# Patient Record
Sex: MALE | Race: WHITE | NOT HISPANIC OR LATINO | Employment: OTHER | URBAN - METROPOLITAN AREA
[De-identification: names, ages, dates, MRNs, and addresses within clinical notes are randomized per-mention and may not be internally consistent; named-entity substitution may affect disease eponyms.]

---

## 2017-01-16 ENCOUNTER — COMMUNICATION - HEALTHEAST (OUTPATIENT)
Dept: INTERNAL MEDICINE | Facility: CLINIC | Age: 67
End: 2017-01-16

## 2017-01-16 DIAGNOSIS — I71.22 ANEURYSM OF AORTIC ARCH (H): ICD-10-CM

## 2017-02-13 ENCOUNTER — AMBULATORY - HEALTHEAST (OUTPATIENT)
Dept: INTERNAL MEDICINE | Facility: CLINIC | Age: 67
End: 2017-02-13

## 2017-02-13 DIAGNOSIS — Z79.899 ENCOUNTER FOR LONG-TERM (CURRENT) USE OF OTHER MEDICATIONS: ICD-10-CM

## 2017-02-13 DIAGNOSIS — Z12.5 SPECIAL SCREENING FOR MALIGNANT NEOPLASM OF PROSTATE: ICD-10-CM

## 2017-02-13 DIAGNOSIS — R73.01 IMPAIRED FASTING GLUCOSE: ICD-10-CM

## 2017-02-13 DIAGNOSIS — Z13.220 LIPID SCREENING: ICD-10-CM

## 2017-02-16 ENCOUNTER — AMBULATORY - HEALTHEAST (OUTPATIENT)
Dept: INTERNAL MEDICINE | Facility: CLINIC | Age: 67
End: 2017-02-16

## 2017-02-16 ENCOUNTER — OFFICE VISIT - HEALTHEAST (OUTPATIENT)
Dept: INTERNAL MEDICINE | Facility: CLINIC | Age: 67
End: 2017-02-16

## 2017-02-16 DIAGNOSIS — Z00.00 MEDICARE ANNUAL WELLNESS VISIT, INITIAL: ICD-10-CM

## 2017-02-16 DIAGNOSIS — Z23 NEED FOR PNEUMOCOCCAL VACCINATION: ICD-10-CM

## 2017-02-16 DIAGNOSIS — K58.9 IRRITABLE BOWEL SYNDROME: ICD-10-CM

## 2017-02-16 DIAGNOSIS — R73.01 IMPAIRED FASTING GLUCOSE: ICD-10-CM

## 2017-02-16 DIAGNOSIS — I71.20 ANEURYSM OF THORACIC AORTA (H): ICD-10-CM

## 2017-02-16 DIAGNOSIS — M23.91 KNEE DERANGEMENT, RIGHT: ICD-10-CM

## 2017-02-16 ASSESSMENT — MIFFLIN-ST. JEOR: SCORE: 1658.48

## 2017-02-24 ENCOUNTER — COMMUNICATION - HEALTHEAST (OUTPATIENT)
Dept: INTERNAL MEDICINE | Facility: CLINIC | Age: 67
End: 2017-02-24

## 2017-02-24 DIAGNOSIS — Z51.81 MEDICATION MONITORING ENCOUNTER: ICD-10-CM

## 2017-03-01 ENCOUNTER — HOSPITAL ENCOUNTER (OUTPATIENT)
Dept: MRI IMAGING | Facility: HOSPITAL | Age: 67
Discharge: HOME | End: 2017-03-01
Attending: INTERNAL MEDICINE

## 2017-03-01 ENCOUNTER — COMMUNICATION - HEALTHEAST (OUTPATIENT)
Dept: TELEHEALTH | Facility: CLINIC | Age: 67
End: 2017-03-01

## 2017-03-01 DIAGNOSIS — M23.91 KNEE DERANGEMENT, RIGHT: ICD-10-CM

## 2017-03-02 ENCOUNTER — COMMUNICATION - HEALTHEAST (OUTPATIENT)
Dept: INTERNAL MEDICINE | Facility: CLINIC | Age: 67
End: 2017-03-02

## 2017-03-10 ENCOUNTER — COMMUNICATION - HEALTHEAST (OUTPATIENT)
Dept: INTERNAL MEDICINE | Facility: CLINIC | Age: 67
End: 2017-03-10

## 2017-03-10 DIAGNOSIS — I71.22 ANEURYSM OF AORTIC ARCH (H): ICD-10-CM

## 2017-03-27 ENCOUNTER — COMMUNICATION - HEALTHEAST (OUTPATIENT)
Dept: INTERNAL MEDICINE | Facility: CLINIC | Age: 67
End: 2017-03-27

## 2017-03-27 DIAGNOSIS — Z51.81 MEDICATION MONITORING ENCOUNTER: ICD-10-CM

## 2017-07-06 ENCOUNTER — COMMUNICATION - HEALTHEAST (OUTPATIENT)
Dept: INTERNAL MEDICINE | Facility: CLINIC | Age: 67
End: 2017-07-06

## 2017-07-06 DIAGNOSIS — Z51.81 MEDICATION MONITORING ENCOUNTER: ICD-10-CM

## 2017-08-01 ENCOUNTER — COMMUNICATION - HEALTHEAST (OUTPATIENT)
Dept: SCHEDULING | Facility: CLINIC | Age: 67
End: 2017-08-01

## 2017-08-01 ENCOUNTER — COMMUNICATION - HEALTHEAST (OUTPATIENT)
Dept: INTERNAL MEDICINE | Facility: CLINIC | Age: 67
End: 2017-08-01

## 2017-08-01 DIAGNOSIS — I10 BENIGN ESSENTIAL HTN: ICD-10-CM

## 2017-08-01 RX ORDER — METOPROLOL SUCCINATE 25 MG/1
25 TABLET, EXTENDED RELEASE ORAL DAILY
Qty: 90 TABLET | Refills: 3 | Status: SHIPPED | OUTPATIENT
Start: 2017-08-01 | End: 2022-05-26

## 2017-08-12 ENCOUNTER — COMMUNICATION - HEALTHEAST (OUTPATIENT)
Dept: INTERNAL MEDICINE | Facility: CLINIC | Age: 67
End: 2017-08-12

## 2017-08-12 DIAGNOSIS — Z51.81 MEDICATION MONITORING ENCOUNTER: ICD-10-CM

## 2017-08-13 RX ORDER — BRIMONIDINE TARTRATE 1 MG/ML
SOLUTION/ DROPS OPHTHALMIC
Qty: 5 ML | Refills: 0 | Status: SHIPPED | OUTPATIENT
Start: 2017-08-13 | End: 2022-05-26

## 2017-09-03 ENCOUNTER — COMMUNICATION - HEALTHEAST (OUTPATIENT)
Dept: INTERNAL MEDICINE | Facility: CLINIC | Age: 67
End: 2017-09-03

## 2017-09-03 DIAGNOSIS — Z51.81 MEDICATION MONITORING ENCOUNTER: ICD-10-CM

## 2017-09-23 ENCOUNTER — COMMUNICATION - HEALTHEAST (OUTPATIENT)
Dept: INTERNAL MEDICINE | Facility: CLINIC | Age: 67
End: 2017-09-23

## 2017-09-23 DIAGNOSIS — Z51.81 MEDICATION MONITORING ENCOUNTER: ICD-10-CM

## 2020-01-08 ENCOUNTER — OFFICE VISIT - HEALTHEAST (OUTPATIENT)
Dept: INTERNAL MEDICINE | Facility: CLINIC | Age: 70
End: 2020-01-08

## 2020-01-08 ENCOUNTER — COMMUNICATION - HEALTHEAST (OUTPATIENT)
Dept: INTERNAL MEDICINE | Facility: CLINIC | Age: 70
End: 2020-01-08

## 2020-01-08 DIAGNOSIS — L85.3 DRY SKIN: ICD-10-CM

## 2020-01-08 RX ORDER — ATENOLOL 25 MG/1
TABLET ORAL
Status: SHIPPED | COMMUNITY
Start: 2019-10-15

## 2020-01-08 ASSESSMENT — MIFFLIN-ST. JEOR: SCORE: 1689.32

## 2021-01-26 ENCOUNTER — IMMUNIZATION (OUTPATIENT)
Dept: NURSING | Facility: CLINIC | Age: 71
End: 2021-01-26
Payer: COMMERCIAL

## 2021-01-26 PROCEDURE — 0001A PR COVID VAC PFIZER DIL RECON 30 MCG/0.3 ML IM: CPT

## 2021-01-26 PROCEDURE — 91300 PR COVID VAC PFIZER DIL RECON 30 MCG/0.3 ML IM: CPT

## 2021-02-16 ENCOUNTER — IMMUNIZATION (OUTPATIENT)
Dept: NURSING | Facility: CLINIC | Age: 71
End: 2021-02-16
Attending: FAMILY MEDICINE
Payer: COMMERCIAL

## 2021-02-16 PROCEDURE — 91300 PR COVID VAC PFIZER DIL RECON 30 MCG/0.3 ML IM: CPT

## 2021-02-16 PROCEDURE — 0002A PR COVID VAC PFIZER DIL RECON 30 MCG/0.3 ML IM: CPT

## 2021-03-14 ENCOUNTER — HEALTH MAINTENANCE LETTER (OUTPATIENT)
Age: 71
End: 2021-03-14

## 2021-07-20 VITALS
OXYGEN SATURATION: 97 % | HEART RATE: 56 BPM | DIASTOLIC BLOOD PRESSURE: 66 MMHG | HEIGHT: 73 IN | BODY MASS INDEX: 25.58 KG/M2 | SYSTOLIC BLOOD PRESSURE: 114 MMHG | WEIGHT: 193 LBS

## 2021-07-20 VITALS — WEIGHT: 186.2 LBS | HEIGHT: 73 IN | BODY MASS INDEX: 24.68 KG/M2

## 2021-07-20 NOTE — PROGRESS NOTES
[facility] Follow Up Note    Assessment/Plan:    1. Dry skin.  Patient does have dry skin and some skin peeling on his left thumb.  Discussed that I do not think he has infection, fungal infection or frostbite.  His thumb appears to be well perfused.  There is mild arthritis in the MTP joint there.  He does see his hands a lot at work and we discussed applying [employer] healing lotion several times throughout the day.    Patient does have PCP with [facility] and I recommended that he follows up there for annual checkups.    Emma Rose MD    Chief Complaint:  Chief Complaint   Patient presents with     Consult     possible infection, possible frostbite on hand denied pain and ache       History of Present Illness:  Javad is a 69 y.o. male, prior patient of Dr. Andrea, who is currently being followed by primary care provider at [facility] who is currently here for acute visit due to skin changes on his right thumb.  He does have history of IBS, aortic aneurysm, sleep apnea, mild anemia and prediabetes.    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  He does have a lot of dry skin and occasional cracks in his fingers.  His left thumb developed some peeling of the skin.  He thinks his he might have burned it slightly when he was cauterizing something at work.  Subsequently he also get mild coldness in his hand when he was cross-country skiing.  Currently he denies any pain.    Review of Systems:  A comprehensive review of systems was performed and was otherwise negative    PFSH:  Social History: Reviewed, he is a practicing dentist  Social History     Tobacco Use   Smoking Status Never Smoker   Smokeless Tobacco Never Used     Social History     Social History Narrative     Not on file       Past History: Reviewed  Current Outpatient Medications   Medication Sig Dispense Refill     ALPHAGAN P 0.1 % Drop INSTILL 1 DROP INTO EACH EYE TWICE DAILY 5 mL 0     ascorbic acid (ASCORBIC ACID)  "250 mg Chew Chew 250 mg.       atenolol (TENORMIN) 25 MG tablet        EPIPEN 2-ARTIE 0.3 mg/0.3 mL atIn USE AS DIRECTED FOR ALLERGIC REACTION 2 Pre-filled Pen Syringe 2     latanoprost (XALATAN) 0.005 % ophthalmic solution 1 drop.       tamsulosin (FLOMAX) 0.4 mg cap Take 0.4 mg by mouth.       metoprolol succinate (TOPROL XL) 25 MG Take 1 tablet (25 mg total) by mouth daily. 90 tablet 3     No current facility-administered medications for this visit.        Family History: Viewed    Physical Exam:    Vitals:    01/08/20 1508   BP: 114/66   Patient Site: Left Arm   Patient Position: Sitting   Cuff Size: Adult Regular   Pulse: (!) 56   SpO2: 97%   Weight: 193 lb (87.5 kg)   Height: 6' 1\" (1.854 m)     Wt Readings from Last 3 Encounters:   01/08/20 193 lb (87.5 kg)   02/16/17 186 lb 3.2 oz (84.5 kg)   02/18/16 178 lb (80.7 kg)     Body mass index is 25.46 kg/m .    Constitutional:  Reveals a pleasant male.  Vitals:  Per nursing notes.  Cardiac:  Regular rate and rhythm,no murmurs, rubs, or gallops. Carotids without bruits. Legs without edema. Palpation of the radial pulse regular.  Lungs: Clear to auscultation bl.  Respiratory effort normal.  Skin: Dry skin on hands bilaterally.  Left thumb has mild skin peeling but no associated cellulitis, fungal infection or open skin area.  Rheumatologic: Mild arthritis in his left thumb noted.  Rest of the fingers are good.  Psychiatric: affect appropriate, memory intact.     Data Review:    Analysis and Summary of Old Records (2): yes      Records Requested (1): no      Other History Summarized (from other people in the room) (2): no    Radiology Tests Summarized (XRAY/CT/MRI/DXA) (1): no    Labs Reviewed (1): no    Medicine Tests Reviewed (EKG/ECHO/COLONOSCOPY/EGD) (1): no    Independent Review of EKG or X-RAY (2): no      "

## 2021-07-20 NOTE — TELEPHONE ENCOUNTER
Spoke with pt to get more information, per pcp request.  Pt reports he went cross country skiing 2 weeks ago, and he may have had frostbite.  Reports his thumb was red, but got better.    No reports his left thumb is swollen, red, painful and the skin is peeling. 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      Reports his thumb did get better from the initial cross country skiing incident, but now it's getting worse.  Pt wants to be seen to make sure there's no infection.    Pt has appt with Dr. Rose today.
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
## 2021-10-24 ENCOUNTER — HEALTH MAINTENANCE LETTER (OUTPATIENT)
Age: 71
End: 2021-10-24

## 2022-02-23 ENCOUNTER — PATIENT OUTREACH (OUTPATIENT)
Dept: ONCOLOGY | Facility: CLINIC | Age: 72
End: 2022-02-23

## 2022-02-23 ENCOUNTER — OFFICE VISIT (OUTPATIENT)
Dept: INTERNAL MEDICINE | Facility: CLINIC | Age: 72
End: 2022-02-23
Payer: COMMERCIAL

## 2022-02-23 VITALS
WEIGHT: 177.4 LBS | OXYGEN SATURATION: 98 % | SYSTOLIC BLOOD PRESSURE: 118 MMHG | BODY MASS INDEX: 23.41 KG/M2 | DIASTOLIC BLOOD PRESSURE: 70 MMHG | HEART RATE: 53 BPM

## 2022-02-23 DIAGNOSIS — Z80.41 FAMILY HISTORY OF MALIGNANT NEOPLASM OF OVARY: ICD-10-CM

## 2022-02-23 DIAGNOSIS — Z00.00 ENCOUNTER FOR MEDICARE ANNUAL WELLNESS EXAM: Primary | ICD-10-CM

## 2022-02-23 DIAGNOSIS — Z12.11 SCREEN FOR COLON CANCER: ICD-10-CM

## 2022-02-23 DIAGNOSIS — Z13.220 SCREENING FOR HYPERLIPIDEMIA: ICD-10-CM

## 2022-02-23 DIAGNOSIS — R73.03 PREDIABETES: ICD-10-CM

## 2022-02-23 PROBLEM — K08.409 WISDOM TEETH EXTRACTED: Status: ACTIVE | Noted: 2022-02-23

## 2022-02-23 PROBLEM — I77.89 AORTIC ROOT ENLARGEMENT (H): Status: ACTIVE | Noted: 2022-02-23

## 2022-02-23 PROBLEM — Z86.0100 HISTORY OF COLONIC POLYPS: Status: ACTIVE | Noted: 2022-02-23

## 2022-02-23 PROBLEM — M17.11 OSTEOARTHRITIS OF RIGHT KNEE, UNSPECIFIED OSTEOARTHRITIS TYPE: Status: ACTIVE | Noted: 2022-02-23

## 2022-02-23 PROBLEM — Z90.89 HX OF TONSILLECTOMY: Status: ACTIVE | Noted: 2022-02-23

## 2022-02-23 LAB
ALBUMIN SERPL-MCNC: 3.8 G/DL (ref 3.5–5)
ALP SERPL-CCNC: 62 U/L (ref 45–120)
ALT SERPL W P-5'-P-CCNC: 24 U/L (ref 0–45)
ANION GAP SERPL CALCULATED.3IONS-SCNC: 6 MMOL/L (ref 5–18)
AST SERPL W P-5'-P-CCNC: 23 U/L (ref 0–40)
BILIRUB SERPL-MCNC: 0.9 MG/DL (ref 0–1)
BUN SERPL-MCNC: 22 MG/DL (ref 8–28)
CALCIUM SERPL-MCNC: 9.4 MG/DL (ref 8.5–10.5)
CHLORIDE BLD-SCNC: 104 MMOL/L (ref 98–107)
CHOLEST SERPL-MCNC: 143 MG/DL
CO2 SERPL-SCNC: 28 MMOL/L (ref 22–31)
CREAT SERPL-MCNC: 0.94 MG/DL (ref 0.7–1.3)
ERYTHROCYTE [DISTWIDTH] IN BLOOD BY AUTOMATED COUNT: 12.3 % (ref 10–15)
FASTING STATUS PATIENT QL REPORTED: YES
GFR SERPL CREATININE-BSD FRML MDRD: 87 ML/MIN/1.73M2
GLUCOSE BLD-MCNC: 132 MG/DL (ref 70–125)
HBA1C MFR BLD: 6 % (ref 0–5.6)
HCT VFR BLD AUTO: 41.1 % (ref 40–53)
HDLC SERPL-MCNC: 45 MG/DL
HGB BLD-MCNC: 13.4 G/DL (ref 13.3–17.7)
LDLC SERPL CALC-MCNC: 86 MG/DL
MCH RBC QN AUTO: 32.3 PG (ref 26.5–33)
MCHC RBC AUTO-ENTMCNC: 32.6 G/DL (ref 31.5–36.5)
MCV RBC AUTO: 99 FL (ref 78–100)
PLATELET # BLD AUTO: 168 10E3/UL (ref 150–450)
POTASSIUM BLD-SCNC: 4.7 MMOL/L (ref 3.5–5)
PROT SERPL-MCNC: 6.7 G/DL (ref 6–8)
RBC # BLD AUTO: 4.15 10E6/UL (ref 4.4–5.9)
SODIUM SERPL-SCNC: 138 MMOL/L (ref 136–145)
TRIGL SERPL-MCNC: 60 MG/DL
WBC # BLD AUTO: 7.4 10E3/UL (ref 4–11)

## 2022-02-23 PROCEDURE — 85027 COMPLETE CBC AUTOMATED: CPT | Performed by: INTERNAL MEDICINE

## 2022-02-23 PROCEDURE — 80061 LIPID PANEL: CPT | Performed by: INTERNAL MEDICINE

## 2022-02-23 PROCEDURE — 36415 COLL VENOUS BLD VENIPUNCTURE: CPT | Performed by: INTERNAL MEDICINE

## 2022-02-23 PROCEDURE — 99397 PER PM REEVAL EST PAT 65+ YR: CPT | Performed by: INTERNAL MEDICINE

## 2022-02-23 PROCEDURE — 80053 COMPREHEN METABOLIC PANEL: CPT | Performed by: INTERNAL MEDICINE

## 2022-02-23 PROCEDURE — 83036 HEMOGLOBIN GLYCOSYLATED A1C: CPT | Performed by: INTERNAL MEDICINE

## 2022-02-23 ASSESSMENT — ACTIVITIES OF DAILY LIVING (ADL): CURRENT_FUNCTION: NO ASSISTANCE NEEDED

## 2022-02-23 NOTE — LETTER
February 24, 2022      Javad Nicholson II  [address] [facility] [address]        Dear SeraBharat,    We are writing to inform you of your test results.    Your tests are satisfactory.  The mild elevation of blood sugar and A1c are consistent with very mild diabetes.  No treatment is needed, other than what you do - maintain your weight, and exercise.     Resulted Orders   Lipid panel reflex to direct LDL Fasting   Result Value Ref Range    Cholesterol 143 <=199 mg/dL    Triglycerides 60 <=149 mg/dL    Direct Measure HDL 45 >=40 mg/dL      Comment:      HDL Cholesterol Reference Range:     0-2 years:   No reference ranges established for patients under 2 years old  at [facility] for lipid analytes.    2-8 years:  Greater than 45 mg/dL     18 years and older:   Female: Greater than or equal to 50 mg/dL   Male:   Greater than or equal to 40 mg/dL    LDL Cholesterol Calculated 86 <=129 mg/dL    Patient Fasting > 8hrs? Yes    CBC with platelets   Result Value Ref Range    WBC Count 7.4 4.0 - 11.0 10e3/uL    RBC Count 4.15 (L) 4.40 - 5.90 10e6/uL    Hemoglobin 13.4 13.3 - 17.7 g/dL    Hematocrit 41.1 40.0 - 53.0 %    MCV 99 78 - 100 fL    MCH 32.3 26.5 - 33.0 pg    MCHC 32.6 31.5 - 36.5 g/dL    RDW 12.3 10.0 - 15.0 %    Platelet Count 168 150 - 450 10e3/uL   Comprehensive metabolic panel   Result Value Ref Range    Sodium 138 136 - 145 mmol/L    Potassium 4.7 3.5 - 5.0 mmol/L    Chloride 104 98 - 107 mmol/L    Carbon Dioxide (CO2) 28 22 - 31 mmol/L    Anion Gap 6 5 - 18 mmol/L    Urea Nitrogen 22 8 - 28 mg/dL    Creatinine 0.94 0.70 - 1.30 mg/dL    Calcium 9.4 8.5 - 10.5 mg/dL    Glucose 132 (H) 70 - 125 mg/dL    Alkaline Phosphatase 62 45 - 120 U/L    AST 23 0 - 40 U/L    ALT 24 0 - 45 U/L    Protein Total 6.7 6.0 - 8.0 g/dL    Albumin 3.8 3.5 - 5.0 g/dL    Bilirubin Total 0.9 0.0 - 1.0 mg/dL    GFR Estimate 87 >60 mL/min/1.73m2      Comment:      Effective December 21, 2021 eGFRcr in adults is  calculated using the 2021 CKD-EPI creatinine equation which includes age and gender (Steffi et al., NEJM, DOI: 10.1056/FHPSjy4294932)   Hemoglobin A1c   Result Value Ref Range    Hemoglobin A1C 6.0 (H) 0.0 - 5.6 %      Comment:      Normal <5.7%   Prediabetes 5.7-6.4%    Diabetes 6.5% or higher     Note: Adopted from ADA consensus guidelines.       If you have any questions or concerns, please call the clinic at the number listed above.       Sincerely,      Jed Hilario MD

## 2022-02-23 NOTE — PATIENT INSTRUCTIONS
Patient Education   Personalized Prevention Plan  You are due for the preventive services outlined below.  Your care team is available to assist you in scheduling these services.  If you have already completed any of these items, please share that information with your care team to update in your medical record.  Health Maintenance Due   Topic Date Due     ANNUAL REVIEW OF HM ORDERS  Never done     Annual Wellness Visit  12/17/2016     Colorectal Cancer Screening  09/08/2018     Cholesterol Lab  12/17/2020     FALL RISK ASSESSMENT  01/08/2021     COVID-19 Vaccine (3 - Booster for Pfizer series) 07/16/2021     Flu Vaccine (1) 09/01/2021

## 2022-02-23 NOTE — PROGRESS NOTES
"Answers for HPI/ROS submitted by the patient on 2/23/2022  In general, how would you rate your overall physical health?: excellent  Frequency of exercise:: 2-3 days/week  Do you usually eat at least 4 servings of fruit and vegetables a day, include whole grains & fiber, and avoid regularly eating high fat or \"junk\" foods? : Yes  Taking medications regularly:: No  Medication side effects:: Muscle aches  Activities of Daily Living: no assistance needed  Home safety: no safety concerns identified  Hearing Impairment:: no hearing concerns  In the past 6 months, have you been bothered by leaking of urine?: No  In general, how would you rate your overall mental or emotional health?: good  Additional concerns today:: Yes  Duration of exercise:: Greater than 60 minutes  Barriers to taking medications:: Side effects    SUBJECTIVE:   CC: Javad Nicholson II is an 72 year old male who presents for preventative health visit.     HPI:  He has been well.  Does get dryness dermatitis on back.  Uses dial soap.  He denies any unusual dyspnea or cough, or bowel or bladder issues.  Weight has been stable.  He continues to work as a dentist.      {SPatient has been advised of split billing requirements and indicates understanding: Yes  Healthy Habits:     In general, how would you rate your overall health?  Excellent    Frequency of exercise:  2-3 days/week    Duration of exercise:  Greater than 60 minutes    Do you usually eat at least 4 servings of fruit and vegetables a day, include whole grains    & fiber and avoid regularly eating high fat or \"junk\" foods?  Yes    Taking medications regularly:  No    Barriers to taking medications:  Side effects    Medication side effects:  Muscle aches    Ability to successfully perform activities of daily living:  No assistance needed    Home Safety:  No safety concerns identified    Hearing Impairment:  No hearing concerns    In the past 6 months, have you been bothered by leaking of urine?  " No    In general, how would you rate your overall mental or emotional health?  Good      PHQ-2 Total Score: 0    Additional concerns today:  Yes    Ability to successfully perform activities of daily living: Yes, no assistance needed    Today's PHQ-2 Score:   PHQ-2 ( 1999 Pfizer) 2/23/2022   Q1: Little interest or pleasure in doing things 0   Q2: Feeling down, depressed or hopeless 0   PHQ-2 Score 0   Q1: Little interest or pleasure in doing things Not at all   Q2: Feeling down, depressed or hopeless Not at all   PHQ-2 Score 0     Abuse: Current or Past(Physical, Sexual or Emotional)- No  Do you feel safe in your environment? Yes    Have you ever done Advance Care Planning? (For example, a Health Directive, POLST, or a discussion with a medical provider or your loved ones about your wishes): Yes, advance care planning is on file.    Social History     Tobacco Use     Smoking status: Never Smoker     Smokeless tobacco: Never Used   Substance Use Topics     Alcohol use: Not on file     Alcohol Use 2/23/2022   Prescreen: >3 drinks/day or >7 drinks/week? No     Last PSA:   Prostate Specific Antigen Screen   Date Value Ref Range Status   12/17/2015 0.4 0.0 - 4.5 ng/mL Final     Reviewed and updated as needed this visit by clinical staff  Reviewed and updated as needed this visit by Provider     Past Medical History:   Diagnosis Date     Aortic root enlargement (H)      History of colonic polyps      Hx of tonsillectomy      Osteoarthritis of right knee, unspecified osteoarthritis type       Past Surgical History:   Procedure Laterality Date     TONSILLECTOMY & ADENOIDECTOMY       WISDOM TOOTH EXTRACTION       Review of Systems  See HPI    OBJECTIVE:     PHYSICAL EXAM:  General Appearance: In no acute distress  /70   Pulse 53   Wt 80.5 kg (177 lb 6.4 oz)   SpO2 98%   BMI 23.41 kg/m    NECK:  without cervical or axillary adenopathy  RESPIRATORY: Clear  CARDIOVASCULAR: S1, S2  ABDOMEN: soft, flat, and non-tender,  "without mass, rebound, or guarding  EXTREMITIES: No joint swelling, no ulcer or edema  NEUROLOGIC: No arm or leg  weakness, speech is clear, gait is normal    ASSESSMENT/PLAN:     Javad was seen today for physical.    Encounter for Medicare annual wellness exam  He is healthy without concerns on present history and exam, see documentation.   -     CBC with platelets  -     Comprehensive metabolic panel    Screen for colon cancer  He feels he is UTD, will get records.      Screening for hyperlipidemia  -     Lipid panel reflex to direct LDL [place]    Family history of malignant neoplasm of ovary  Given that he has daughter, and given his family history, will get genetic counseling.   -     Cancer Risk Mgmt/Cancer Genetic Counseling Referral; Future    Prediabetes  Has not seemed to need any treatment.  Has family history.  Will follow.  He may be candidate for metformin in the future.   -     Hemoglobin A1c    Patient has been advised of split billing requirements and indicates understanding: Yes    COUNSELING:   Reviewed preventive health counseling, as reflected in patient instructions       Regular exercise       Healthy diet/nutrition       Colorectal cancer screening       Prostate cancer screening    Estimated body mass index is 25.46 kg/m  as calculated from the following:    Height as of 1/8/20: 1.854 m (6' 1\").    Weight as of 1/8/20: 87.5 kg (193 lb).     He reports that he has never smoked. He has never used smokeless tobacco.    Jed Hilario MD  [facility]  "

## 2022-03-10 NOTE — PROGRESS NOTES
"SUBJECTIVE:     Fall risk   No recent falls in the last 12 months   Not afraid of falling       Cognitive Screening   1) Repeat 3 items (Leader, Season, Table)    2) Clock draw: NORMAL  3) 3 item recall: Recalls 3 objects  Results: 3 items recalled: COGNITIVE IMPAIRMENT LESS LIKELY    Mini-CogTM Copyright S Karime. Licensed by the author for use in [facility]; reprinted with permission (renny@[email or web address]). All rights reserved.      Answers for HPI/ROS submitted by the patient on 2/23/2022  In general, how would you rate your overall physical health?: excellent  Frequency of exercise:: 2-3 days/week  Do you usually eat at least 4 servings of fruit and vegetables a day, include whole grains & fiber, and avoid regularly eating high fat or \"junk\" foods? : Yes  Taking medications regularly:: No  Medication side effects:: Muscle aches  Activities of Daily Living: no assistance needed  Home safety: no safety concerns identified  Hearing Impairment:: no hearing concerns  In the past 6 months, have you been bothered by leaking of urine?: No  In general, how would you rate your overall mental or emotional health?: good  Additional concerns today:: Yes  Duration of exercise:: Greater than 60 minutes  Barriers to taking medications:: Side effects      "

## 2022-03-10 NOTE — PROGRESS NOTES
"SUBJECTIVE:   Javad Nicholson II is a 72 year old male who presents for Preventive Visit.      Reviewed and updated as needed this visit by clinical staff   Tobacco  Allergies  Meds  Problems  Med Hx  Surg Hx  Fam Hx  Soc   Hx        Reviewed and updated as needed this visit by Provider   Tobacco  Allergies   Problems  Med Hx  Surg Hx  Fam Hx         Social History     Tobacco Use     Smoking status: Never Smoker     Smokeless tobacco: Never Used   Substance Use Topics     Alcohol use: Not on file         Alcohol Use 2/23/2022   Prescreen: >3 drinks/day or >7 drinks/week? No   No flowsheet data found.      Patient Care Team:  No Ref-Primary, Physician as PCP - General  Emma Rose MD as Assigned PCP  Jed Hilario MD as Referring Physician (Internal Medicine)    The following health maintenance items are reviewed in Epic and correct as of today:  Health Maintenance Due   Topic Date Due     ANNUAL REVIEW OF  ORDERS  Never done     DTAP/TDAP/TD IMMUNIZATION (1 - Tdap) Never done     ZOSTER IMMUNIZATION (1 of 2) Never done     Pneumococcal Vaccine: 65+ Years (1 of 1 - PPSV23) Never done     COLORECTAL CANCER SCREENING  09/08/2018     FALL RISK ASSESSMENT  01/08/2021     COVID-19 Vaccine (3 - Booster for Pfizer series) 07/16/2021     INFLUENZA VACCINE (1) 09/01/2021             ASSESSMENT / PLAN:         COUNSELING:  Reviewed preventive health counseling, as reflected in patient instructions    Estimated body mass index is 23.41 kg/m  as calculated from the following:    Height as of 1/8/20: 1.854 m (6' 1\").    Weight as of this encounter: 80.5 kg (177 lb 6.4 oz).    He reports that he has never smoked. He has never used smokeless tobacco.      Appropriate preventive services were discussed with this patient, including applicable screening as appropriate for cardiovascular disease, diabetes, osteopenia/osteoporosis, and glaucoma.  As appropriate for age/gender, discussed screening for " colorectal cancer, prostate cancer, breast cancer, and cervical cancer. Checklist reviewing preventive services available has been given to the patient.    Reviewed patients plan of care and provided an AVS. The Basic Care Plan (routine screening as documented in Health Maintenance) for Javad meets the Care Plan requirement. This Care Plan has been established and reviewed with the Patient.    Counseling Resources:  ATP IV Guidelines  Pooled Cohorts Equation Calculator  Breast Cancer Risk Calculator  Breast Cancer: Medication to Reduce Risk  FRAX Risk Assessment  ICSI Preventive Guidelines  Dietary Guidelines for Americans, 2010  USDA's MyPlate  ASA Prophylaxis  Lung CA Screening    Jed Hilario MD  [facility]    Identified Health Risks:  none

## 2022-05-26 ENCOUNTER — VIRTUAL VISIT (OUTPATIENT)
Dept: ONCOLOGY | Facility: CLINIC | Age: 72
End: 2022-05-26
Attending: INTERNAL MEDICINE
Payer: COMMERCIAL

## 2022-05-26 DIAGNOSIS — Z80.41 FAMILY HISTORY OF MALIGNANT NEOPLASM OF OVARY: Primary | ICD-10-CM

## 2022-05-26 DIAGNOSIS — Z80.3 FAMILY HISTORY OF MALIGNANT NEOPLASM OF BREAST: ICD-10-CM

## 2022-05-26 DIAGNOSIS — Z80.8 FAMILY HISTORY OF SKIN CANCER: ICD-10-CM

## 2022-05-26 DIAGNOSIS — Z80.43 FAMILY HISTORY OF TESTICULAR CANCER: ICD-10-CM

## 2022-05-26 DIAGNOSIS — Z20.2 STD EXPOSURE: Primary | ICD-10-CM

## 2022-05-26 PROCEDURE — 96040 HC GENETIC COUNSELING, EACH 30 MINUTES: CPT | Mod: GT,95 | Performed by: GENETIC COUNSELOR, MS

## 2022-05-26 NOTE — PATIENT INSTRUCTIONS
Assessing Cancer Risk  Only about 5-10% of cancers are thought to be due to an inherited cancer susceptibility gene.    These families often have:  Several people with the same or related types of cancer  Cancers diagnosed at a young age (before age 50)  Individuals with more than one primary cancer  Multiple generations of the family affected with cancer    Some people may be candidates for genetic testing of more than one gene.  For these families, genetic testing using a cancer panel may be offered.  These panels can test many genes at once known to increase the risk for gynecologic (and other) cancers:  BRCA1, BRCA2, BRIP1 MLH1, MSH2, MSH6, PMS2, EPCAM, PTEN, PALB2, RAD51C, RAD51D, and TP53. The purpose of this handout is to serve as a brief summary of the gynecologic cancer risk genes that have published clinical management guidelines for individuals who are found to carry a mutation.    ______________________________________________________________________________  Hereditary Breast and Ovarian Cancer Syndrome   (BRCA1 and BRCA2)  A single mutation in one of the copies of BRCA1 or BRCA2 increases the risk for breast and ovarian cancer, among others.  The risk for pancreatic cancer and melanoma may also be slightly increased in some families.  The chart below shows the chance that someone with a BRCA mutation would develop cancer in his or her lifetime1,2,3,4.        A person s ethnic background is also important to consider, as individuals of Ashkenazi Zoroastrian ancestry have a higher chance of having a BRCA gene mutation.  There are three BRCA mutations that occur more frequently in this population.    Leija Syndrome   (MLH1, MSH2, MSH6, PMS2, and EPCAM)  Currently five genes are known to cause Leija Syndrome: MLH1, MSH2, MSH6, PMS2, and EPCAM.  A single mutation in one of the Leija Syndrome genes increases the risk for colon, endometrial, ovarian, and stomach cancers.  Other cancers that occur less  commonly in Leija Syndrome include urinary tract, skin, and brain cancers.  The chart below shows the chance that a person with Leija syndrome would develop cancer in his or her lifetime7.      *Cancer risk varies depending on Leija syndrome gene found    Cowden Syndrome   (PTEN)  Cowden syndrome is a hereditary condition that increases the risk for breast, thyroid, endometrial, and kidney cancer.  Cowden syndrome is caused by a mutation in the PTEN gene.  A single mutation in one of the copies of PTEN causes Cowden syndrome and increases cancer risk.  The chart below shows the chance that someone with a PTEN mutation would develop cancer in their lifetime5,6.  Other benign features seen in some individuals with Cowden syndrome include benign skin lesions (facial papules, keratoses, lipomas), learning disability, autism, thyroid nodules, colon polyps, and larger head size.      *One recent study found breast cancer risk to be increased to 85%  Li-Fraumeni Syndrome   (TP53)  Li-Fraumeni Syndrome (LFS) is a cancer predisposition syndrome caused by a mutation in the TP53 gene. A single mutation in one of the copies of TP53 increases the risk for multiple cancers. Individuals with LFS are at an increased risk for developing cancer at a young age. The general lifetime risk for development of cancer is 50% by age 30 and 90% by age 60.     Core Cancers: Sarcomas, Breast, Brain, Lung, Leukemias/Lymphomas, Adrenocortical carcinomas  Other Cancers: Gastrointestinal, Thyroid, Skin, Genitourinary    Additional Genes  PALB2  Mutations in PALB2 have been shown to increase the risk of breast cancer up to 33-58% in some families; where individuals fall within this risk range is dependent upon family history. PALB2 mutations have also been associated with increased risk for pancreatic cancer, although this risk has not been quantified yet.  Individuals who inherit two PALB2 mutations--one from their mother and one from their  father--have a condition called Fanconi Anemia.  This rare autosomal recessive condition is associated with short stature, developmental delay, bone marrow failure, and increased risk for childhood cancers.    BRIP1, RAD51C and RAD51D  Mutations in BRIP1, RAD51C, and RAD51D have been shown to increase the risk of ovarian cancer as well as female breast cancer.    ______________________________________________________________________________    Inheritance  All of the cancer syndromes reviewed above are inherited in an autosomal dominant pattern.  This means that if a parent has a mutation, each of his or her children will have a 50% chance of inheriting that same mutation.  Therefore, each child--male or female--would have a 50% chance of being at increased risk for developing cancer.      Image obtained from Genetics Home Reference, 2013     Mutations in some genes can occur de genevieve, which means that a person s mutation occurred for the first time in them and was not inherited from a parent.  Now that they have the mutation, however, it can be passed on to future generations.    Genetic Testing  Genetic testing involves a blood test and will look for any harmful mutations that are associated with increased cancer risk.  If possible, it is recommended that the person(s) who has had cancer be tested before other family members.  That person will give us the most useful information about whether or not a specific gene is associated with the cancer in the family.    Results  There are three possible results of genetic testing:  Positive--a harmful mutation was identified in one or more of the genes  Negative--no mutation was identified in any of the 9 genes on this panel  Variant of unknown significance--a variation in one of the genes was identified, but it is unclear how this impacts cancer risk in the family    Advantages and Disadvantages   There are advantages and disadvantages to genetic testing.  Advantages  May  clarify your cancer risk  Can help you make medical decisions  May explain the cancers in your family  May give useful information to your family members (if you share your results)    Disadvantages  Possible negative emotional impact of learning about inherited cancer risk  Uncertainty in interpreting a negative test result in some situations  Possible genetic discrimination concerns (see below)    Genetic Information Nondiscrimination Act (IZZY)  IZZY is a federal law that protects individuals from health insurance or employment discrimination based on a genetic test result alone.  Although rare, there are currently no legal discrimination protections in terms of life insurance, long term care, or disability insurances.  Visit the [employer] [place] website to learn more.    Reducing Cancer Risk  Each of the genes listed within this handout have nationally recognized cancer screening guidelines that would be recommended for individuals who test positive.  In addition to increased cancer screening, surgeries may be offered or recommended to reduce cancer risk.  Recommendations are based upon an individual s genetic test result as well as their personal and family history of cancer.    Questions to Think About Regarding Genetic Testing:  What effect will the test result have on me and my relationship with my family members if I have an inherited gene mutation?  If I don t have a gene mutation?  Should I share my test results, and how will my family react to this news, which may also affect them?  Are my children ready to learn new information that may one day affect their own health?    Hereditary Cancer Resources    FORCE: Facing Our Risk of Cancer Empowered facingourrisk.org   Bright Pink bebrightpink.org   Li-Fraumeni Syndrome Association lfsassociation.org   PTEN World PTENworld.com   Collaborative Group of the Americas on Inherited Colorectal Cancer (CGA) cgaicc.com  http://www.facingourrisk.org/   Cancer Care cancercare.org   American Cancer Society (ACS) cancer.org   National Cancer [employer] (NCI) cancer.gov     Please call us if you have any questions or concerns.   Cancer Risk Management Program [phone]-CANCER ([phone])  Massimo Linda, MS [facility] [phone]  Kimmy Ewing, MS, [facility] [phone]  STEVE    Marii Olivo, MS, [facility]  [phone]    [email or web address]  Norma Diaz, MS, [facility]  [phone]  Nitza Jerrod, MS, [facility]  [phone]  Harmony Nealel, MS, [facility] [phone]  Lazara Vera, MS, [facility] [phone]    References  Milton SOLITARIO, Brianna PDP, Lucia S, Madiha ROQUE, Denise JE, Susan JL, Bianka N, Ricky H, Maximus O, Brea A, Tomi B, Elham P, Gordo S, Mary DM, Reyes N, Janneth E, Yen H, Salazar E, Lubinski J, Gronwald J, Cathleen B, Ariane H, Thorlacius S, Eerola H, Yaron H, Polly K, Lisandro OP. Average risks of breast and ovarian cancer associated with BRCA1 or BRCA2 mutations detected in case series unselected for family history: a combined analysis of 222 studies. Am J Hum [place]. 2003;72:1117-30.  Yeni N, Lisbet M, Khai G.  BRCA1 and BRCA2 Hereditary Breast and Ovarian Cancer. Gene Reviews online. 2013.  Júnior YC, Iain S, Devan G, Shearer S. Breast cancer risk among male BRCA1 and BRCA2 mutation carriers. J Natl Cancer Inst. 2007;99:1811-4.  Segun HERNÁNDEZ, Ama I, Doyle SINGH, Latoya E, Naina ER, Eleanor F. Risk of breast cancer in male BRCA2 carriers. J Med [place]. 2010;47:710-1.  Ivan IRVIN, Day SINGH, Georgiana SINGH, Kang CARTER, Or[place] MS, Eng C. Lifetime cancer risks in individuals with germline PTEN mutations. Clin Cancer Res. 2012;18:400-7.  Barrett MORLEY. Cowden Syndrome: A Critical Review of the Clinical Literature. J [place] . 2009:18:13-27.  National [facility] Cancer Network. Clinical practice guidelines in oncology, colorectal cancer screening. Available online (registration required). 2015.  Milton FAUSTIN, et al.  Breast-Cancer Risk in Families with Mutations in PALB2. NEJM. 2014; 371(6):497-506.

## 2022-05-26 NOTE — LETTER
"    Cancer Risk Management  Program Locations    [facility]  [facility]  [facility]  [facility]  [facility]  Mailing Address  Cancer Risk Management Program  [address]  [place] [address]    New patient appointments  [phone]  May 29, 2022    Javad Nicholson II  [address]  [facility] [address]    Dear Baltazar,    It was a pleasure talking with you on 5-. Here is a copy of the progress note from your recent genetic counseling visit to the Cancer Risk Management Program. If you have any additional questions, please feel free to contact me.    Cancer Risk Management Program Genetic Counseling Note    5/26/2022    Referring Provider: Dr. Jed Hilario    Presenting Information:   I had a video visit with Javad Nicholson (Bill) today for genetic counseling through the Cancer Risk Management Program, in order to discuss his family history of ovarian and other cancer.  He presents today to review this history, cancer screening recommendations, and available genetic testing options.    Personal History:  Baltazar is a 72 year old male. He does not have any personal history of cancer.  He does report that he has had a \"pre-cancerous\" spot removed from the skin on his nose.  In other medical history, Baltazar has been noted to have a dilated aortic root, for which he has had regular cardiology follow-up; it is reported that his aortic root measurement has been stable for the last several years. Baltazar has also had a history of a slightly elevated hemoglobin A1c measurement, but he does not have a diagnosis of diabetes. 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      With respect to " his cancer screening history, Baltazar reports having his last colonoscopy around 4 years ago; he recalls having a possible colon polyp removed at that time but that he was told that his next colonoscopy didn't need to be for another 10 years; he reports that he does not recall having any polyps noted on colonoscopy prior to that.  With respect to prostate cancer screening, Baltazar reports that he has not had any abnormal PSA levels.  The most recent PSA level in Baltazar's chart was in ; Baltazar reports that his primary care provider has told him that PSA levels were not indicated after age 70.    Other than occasional smoking as a young adult, Baltazar reports no personal history of smoking.  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    Family History: (Please see scanned pedigree for detailed family history information)    Baltazar reports that his sister was diagnosed with ovarian cancer at the age of 38 and, unfortunately,  of complications of that cancer at age 40.  Genetic testing for hereditary cancer susceptibility genes was not available at that time.    Baltazar states that his brother was diagnosed with testicular cancer at age 47, for which he had surgery and radiation.    Baltazar reports that his father had a history of skin cancer (not melanoma); he reports that his father had a history of sun exposure, which could have been a contributing factor for that cancer history.    Baltazar reports that his paternal grandfather had a history of a brain tumor diagnosed in his late 50s.    Baltazar reports that his maternal grandmother was diagnosed with breast cancer around age 73.    Baltazar reports that his family is of Egyptian and Bahraini ancestry.  There is no known Ashkenazi Druze ancestry on either side of his family.    Discussion:    We reviewed the features of  "sporadic, familial, and hereditary cancers. In looking at Baltazar's family history, it is possible that a cancer susceptibility gene mutation is present due to his sister's history of ovarian cancer.  We talked about that it has been estimated that up to 25% of ovarian cancers are related to the inheritance of a specific, identifiable mutation in a known hereditary cancer gene.  In addition, we talked about that Baltazar's sister's ovarian cancer was of an unusually early onset, which is also suspicious for the possibility of a hereditary cancer predisposition.      We discussed the natural history and genetics of hereditary cancer syndromes associated with ovarian cancer. A detailed handout regarding some of these hereditary cancer syndromes and the information we discussed was provided to Baltazar after our appointment today and can be found in the \"patient instructions.\" Topics included: inheritance pattern, cancer risks, cancer screening recommendations, and also risks, benefits and limitations of testing.      Based on his family history, Baltazar meets current National Comprehensive Cancer Network (NCCN) criteria for genetic testing of ovarian cancer susceptibility genes (including BRCA1, BRCA2, Leija syndrome genes,  PALB2, RAD51C, RAD51C, etc., because of his sister's history of early onset ovarian cancer.       We discussed that there are additional genes besides those above that could cause increased risk for ovarian (or other) cancer. As many of these genes present with overlapping features in a family and accurate cancer risk cannot always be established based upon the pedigree analysis alone, it would be reasonable for Baltazar to consider panel genetic testing to analyze multiple genes at once.      Baltazar stated that he is interested in pursuing genetic testing through a panel of genes associated with hereditary cancer syndromes, and so we reviewed the various panel options and their potential benefits and limitations.  " After this conversation, Baltazar decided that he is interested in genetic analysis for the BRCA1/BRCA2 genes with an automatic reflex to the Common Hereditary Cancers genetic testing panel at [facility].      The Common Hereditary Cancers genetic testing panel through [facility] includes analysis for 47 genes associated with cancers of the breast, ovary, uterus, prostate and gastrointestinal system: APC, LEON, AXIN2, BARD1, BMPR1A, BRCA1, BRCA2, BRIP1, CDH1, CDK4, CDKN2A, CHEK2, CTNNA1, DICER1, EPCAM, GREM1, HOXB13, KIT, MEN1, MLH1, MSH2, MSH3, MSH6, MUTYH, NBN, NF1, NTHL1, PALB2, PDGFRA, PMS2, POLD1, POLE, PTEN,RAD50, RAD51C, RAD51D, SDHA, SDHB, SDHC, SDHD, SMAD4, SMARCA4, STK11, TP53,TSC1, TSC2, and VHL.        We discussed that many of these genes are associated with specific hereditary cancer syndromes and published management guidelines: Hereditary Breast and Ovarian Cancer syndrome (BRCA1, BRCA2), Leija syndrome (MLH1, MSH2, MSH6, PMS2, EPCAM), Familial Adenomatous Polyposis (APC), Hereditary Diffuse Gastric Cancer (CDH1), Familial Atypical Multiple Mole Melanoma syndrome (CDK4, CDKN2A), Multiple Endocrine Neoplasia type 1 (MEN1), Juvenile Polyposis syndrome (BMPR1A, SMAD4), Cowden syndrome (PTEN), Li Fraumeni syndrome (TP53), Hereditary Paraganglioma and Pheochromocytoma syndrome (SDHA, SDHB, SDHC, SDHD), Peutz-Jeghers syndrome (STK11), MUTYH Associated Polyposis (MUTYH), Tuberous sclerosis complex (TSC1, TSC2), Von Hippel-Lindau disease (VHL), and Neurofibromatosis type 1 (NF1). The LEON, AXIN2, BRIP1, CHEK2, GREM1, MSH3, NBN, NTHL1, PALB2, POLD1, POLE, RAD51C, and RAD51D genes are associated with increased cancer risk and have published management guidelines for certain cancers. The remaining genes (BARD1, CTNNA1, DICER1, HOXB13, KIT, PDGFRA, RAD50, and SMARCA4) are associated with increased cancer risk and may allow us to make medical recommendations when mutations are identified.       Medical Management: For Baltazar,  we reviewed that the information from genetic testing may determine:    additional cancer screening for which Baltazar may qualify (eg. continuation of prostate cancer screening, more frequent colonoscopies, more frequent dermatologic exams, etc.),     and targeted therapies for Baltazar, if he were to develop certain cancers in the future (eg. immunotherapy for individuals with Leija syndrome, PARP inhibitors for HBOC syndrome, etc.).       These recommendations and possible targeted therapies will be discussed in detail once genetic testing is completed.  In addition, we will talk about about potential recommendations for Baltazar's family members at that time.    Plan:  1) Today, Baltazar decided to proceed with a BRCA1/BRCA2 genetic analysis with an automatic reflex to the Common Hereditary Cancers genetic testing panel through [facility].  Therefore, consent was reviewed and verbally obtained for this testing.    2) We reviewed the options for sample collection.  Baltazar plans to have his blood drawn at his local [facility]. Test results are expected to be available within 4-6 weeks of the blood draw.  3) Baltazar will either have a telephone visit or video visit to discuss the results.  Additional recommendations about screening and other medical management recommendations will be made at that time.    Marii Olivo MS, [facility]  Genetic Counselor  Ph: [phone]  Face to face time via video: 43 minutes

## 2022-05-26 NOTE — PROGRESS NOTES
"Cancer Risk Management Program Genetic Counseling Note    5/26/2022    Referring Provider: Dr. Jed Hilario    Presenting Information:   I had a video visit with Javad Nicholson (Bill) today for genetic counseling through the Cancer Risk Management Program, in order to discuss his family history of ovarian and other cancer.  He presents today to review this history, cancer screening recommendations, and available genetic testing options.    Personal History:  Baltazar is a 72 year old male. He does not have any personal history of cancer.  He does report that he has had a \"pre-cancerous\" spot removed from the skin on his nose.  In other medical history, Baltazar has been noted to have a dilated aortic root, for which he has had regular cardiology follow-up; it is reported that his aortic root measurement has been stable for the last several years. Baltazar has also had a history of a slightly elevated hemoglobin A1c measurement, but he does not have a diagnosis of diabetes. 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      With respect to his cancer screening history, Baltazar reports having his last colonoscopy around 4 years ago; he recalls having a possible colon polyp removed at that time but that he was told that his next colonoscopy didn't need to be for another 10 years; he reports that he does not recall having any polyps noted on colonoscopy prior to that.  With respect to prostate cancer screening, Baltazar reports that he has not had any abnormal PSA levels.  The most recent PSA level in Baltazar's chart was in 2015; Baltazar reports that his primary care provider has told him that PSA levels were not indicated after age 70.    Other than occasional smoking as a young adult, Baltazar reports no personal history of smoking.  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    Family History: (Please see scanned pedigree for detailed family history information)    Baltazar reports that his sister was diagnosed with ovarian cancer at the age of 38 and, unfortunately,  of complications of that cancer at age 40.  Genetic testing for hereditary cancer susceptibility genes was not available at that time.    Baltazar states that his brother was diagnosed with testicular cancer at age 47, for which he had surgery and radiation.    Baltazar reports that his father had a history of skin cancer (not melanoma); he reports that his father had a history of sun exposure, which could have been a contributing factor for that cancer history.    Baltazar reports that his paternal grandfather had a history of a brain tumor diagnosed in his late 50s.    Baltazar reports that his maternal grandmother was diagnosed with breast cancer around age 73.    Baltazar reports that his family is of Portuguese and [place] ancestry.  There is no known Ashkenazi Pentecostal ancestry on either side of his family.    Discussion:    We reviewed the features of sporadic, familial, and hereditary cancers. In looking at Baltazar's family history, it is possible that a cancer susceptibility gene mutation is present due to his sister's history of ovarian cancer.  We talked about that it has been estimated that up to 25% of ovarian cancers are related to the inheritance of a specific, identifiable mutation in a known hereditary cancer gene.  In addition, we talked about that Baltazar's sister's ovarian cancer was of an unusually early onset, which is also suspicious for the possibility of a hereditary cancer predisposition.      We discussed the natural history and genetics of hereditary cancer syndromes associated with ovarian cancer. A detailed handout regarding some of these hereditary cancer  "syndromes and the information we discussed was provided to Baltazar after our appointment today and can be found in the \"patient instructions.\" Topics included: inheritance pattern, cancer risks, cancer screening recommendations, and also risks, benefits and limitations of testing.      Based on his family history, Baltazar meets current National Comprehensive Cancer Network (NCCN) criteria for genetic testing of ovarian cancer susceptibility genes (including BRCA1, BRCA2, Leija syndrome genes,  PALB2, RAD51C, RAD51C, etc., because of his sister's history of early onset ovarian cancer.       We discussed that there are additional genes besides those above that could cause increased risk for ovarian (or other) cancer. As many of these genes present with overlapping features in a family and accurate cancer risk cannot always be established based upon the pedigree analysis alone, it would be reasonable for Baltazar to consider panel genetic testing to analyze multiple genes at once.      Baltazar stated that he is interested in pursuing genetic testing through a panel of genes associated with hereditary cancer syndromes, and so we reviewed the various panel options and their potential benefits and limitations.  After this conversation, Baltazar decided that he is interested in genetic analysis for the BRCA1/BRCA2 genes with an automatic reflex to the Common Hereditary Cancers genetic testing panel at [facility].      The Common Hereditary Cancers genetic testing panel through [facility] includes analysis for 47 genes associated with cancers of the breast, ovary, uterus, prostate and gastrointestinal system: APC, LEON, AXIN2, BARD1, BMPR1A, BRCA1, BRCA2, BRIP1, CDH1, CDK4, CDKN2A, CHEK2, CTNNA1, DICER1, EPCAM, GREM1, HOXB13, KIT, MEN1, MLH1, MSH2, MSH3, MSH6, MUTYH, NBN, NF1, NTHL1, PALB2, PDGFRA, PMS2, POLD1, POLE, PTEN,RAD50, RAD51C, RAD51D, SDHA, SDHB, SDHC, SDHD, SMAD4, SMARCA4, STK11, TP53,TSC1, TSC2, and VHL.        We discussed that many " of these genes are associated with specific hereditary cancer syndromes and published management guidelines: Hereditary Breast and Ovarian Cancer syndrome (BRCA1, BRCA2), Leija syndrome (MLH1, MSH2, MSH6, PMS2, EPCAM), Familial Adenomatous Polyposis (APC), Hereditary Diffuse Gastric Cancer (CDH1), Familial Atypical Multiple Mole Melanoma syndrome (CDK4, CDKN2A), Multiple Endocrine Neoplasia type 1 (MEN1), Juvenile Polyposis syndrome (BMPR1A, SMAD4), Cowden syndrome (PTEN), Li Fraumeni syndrome (TP53), Hereditary Paraganglioma and Pheochromocytoma syndrome (SDHA, SDHB, SDHC, SDHD), Peutz-Jeghers syndrome (STK11), MUTYH Associated Polyposis (MUTYH), Tuberous sclerosis complex (TSC1, TSC2), Von Hippel-Lindau disease (VHL), and Neurofibromatosis type 1 (NF1). The LEON, AXIN2, BRIP1, CHEK2, GREM1, MSH3, NBN, NTHL1, PALB2, POLD1, POLE, RAD51C, and RAD51D genes are associated with increased cancer risk and have published management guidelines for certain cancers. The remaining genes (BARD1, CTNNA1, DICER1, HOXB13, KIT, PDGFRA, RAD50, and SMARCA4) are associated with increased cancer risk and may allow us to make medical recommendations when mutations are identified.       Medical Management: For Baltazar, we reviewed that the information from genetic testing may determine:    additional cancer screening for which Baltazar may qualify (eg. continuation of prostate cancer screening, more frequent colonoscopies, more frequent dermatologic exams, etc.),     and targeted therapies for Baltazar, if he were to develop certain cancers in the future (eg. immunotherapy for individuals with Leija syndrome, PARP inhibitors for HBOC syndrome, etc.).       These recommendations and possible targeted therapies will be discussed in detail once genetic testing is completed.  In addition, we will talk about about potential recommendations for Baltazar's family members at that time.    Plan:  1) Today, Baltazar decided to proceed with a BRCA1/BRCA2 genetic  analysis with an automatic reflex to the Common Hereditary Cancers genetic testing panel through [facility].  Therefore, consent was reviewed and verbally obtained for this testing.    2) We reviewed the options for sample collection.  Baltazar plans to have his blood drawn at his local [facility]. Test results are expected to be available within 4-6 weeks of the blood draw.  3) Baltazar will either have a telephone visit or video visit to discuss the results.  Additional recommendations about screening and other medical management recommendations will be made at that time.    Marii Olivo MS, [facility]  Genetic Counselor  Ph: [phone]    Face to face time via video: 43 minutes

## 2022-05-26 NOTE — LETTER
"    5/26/2022         RE: Javad Nicholson II  [address]  [facility] [address]        Dear Colleague,    Thank you for referring your patient, Javad Nicholson II, to the [facility]. Please see a copy of my visit note below.      Cancer Risk Management Program Genetic Counseling Note    5/26/2022    Referring Provider: Dr. Jed Hilario    Presenting Information:   I had a video visit with Javad Nicholson (Bill) today for genetic counseling through the Cancer Risk Management Program, in order to discuss his family history of ovarian and other cancer.  He presents today to review this history, cancer screening recommendations, and available genetic testing options.    Personal History:  Baltazar is a 72 year old male. He does not have any personal history of cancer.  He does report that he has had a \"pre-cancerous\" spot removed from the skin on his nose.  In other medical history, Baltazar has been noted to have a dilated aortic root, for which he has had regular cardiology follow-up; it is reported that his aortic root measurement has been stable for the last several years. Baltazar has also had a history of a slightly elevated hemoglobin A1c measurement, but he does not have a diagnosis of diabetes. 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      With respect to his cancer screening history, Baltazar reports having his last colonoscopy around 4 years ago; he recalls having a possible colon polyp removed at that time but that he was told that his next colonoscopy didn't need to be for another 10 years; he reports that he does not recall having any polyps noted on colonoscopy prior to that.  With respect to prostate cancer screening, Baltazar reports that he has not had any abnormal PSA levels.  The most recent PSA level in Baltazar's " chart was in 2015; Baltazar reports that his primary care provider has told him that PSA levels were not indicated after age 70.    Other than occasional smoking as a young adult, Baltazar reports no personal history of smoking.  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    Family History: (Please see scanned pedigree for detailed family history information)    Baltazar reports that his sister was diagnosed with ovarian cancer at the age of 38 and, unfortunately,  of complications of that cancer at age 40.  Genetic testing for hereditary cancer susceptibility genes was not available at that time.    Baltazar states that his brother was diagnosed with testicular cancer at age 47, for which he had surgery and radiation.    Baltazar reports that his father had a history of skin cancer (not melanoma); he reports that his father had a history of sun exposure, which could have been a contributing factor for that cancer history.    Baltazar reports that his paternal grandfather had a history of a brain tumor diagnosed in his late 50s.    Baltazar reports that his maternal grandmother was diagnosed with breast cancer around age 73.    Baltazar reports that his family is of Egyptian and [place] ancestry.  There is no known Ashkenazi Lutheran ancestry on either side of his family.    Discussion:    We reviewed the features of sporadic, familial, and hereditary cancers. In looking at Baltazar's family history, it is possible that a cancer susceptibility gene mutation is present due to his sister's history of ovarian cancer.  We talked about that it has been estimated that up to 25% of ovarian cancers are related to the inheritance of a specific, identifiable mutation in a known hereditary cancer gene.  In addition, we talked about that Baltazar's sister's ovarian cancer was of an unusually early  "onset, which is also suspicious for the possibility of a hereditary cancer predisposition.      We discussed the natural history and genetics of hereditary cancer syndromes associated with ovarian cancer. A detailed handout regarding some of these hereditary cancer syndromes and the information we discussed was provided to Baltazar after our appointment today and can be found in the \"patient instructions.\" Topics included: inheritance pattern, cancer risks, cancer screening recommendations, and also risks, benefits and limitations of testing.      Based on his family history, Baltazar meets current National Comprehensive Cancer Network (NCCN) criteria for genetic testing of ovarian cancer susceptibility genes (including BRCA1, BRCA2, Leija syndrome genes,  PALB2, RAD51C, RAD51C, etc., because of his sister's history of early onset ovarian cancer.       We discussed that there are additional genes besides those above that could cause increased risk for ovarian (or other) cancer. As many of these genes present with overlapping features in a family and accurate cancer risk cannot always be established based upon the pedigree analysis alone, it would be reasonable for Baltazar to consider panel genetic testing to analyze multiple genes at once.      Baltazar stated that he is interested in pursuing genetic testing through a panel of genes associated with hereditary cancer syndromes, and so we reviewed the various panel options and their potential benefits and limitations.  After this conversation, Baltazar decided that he is interested in genetic analysis for the BRCA1/BRCA2 genes with an automatic reflex to the Common Hereditary Cancers genetic testing panel at [facility].      The Common Hereditary Cancers genetic testing panel through [facility] includes analysis for 47 genes associated with cancers of the breast, ovary, uterus, prostate and gastrointestinal system: APC, LEON, AXIN2, BARD1, BMPR1A, BRCA1, BRCA2, BRIP1, CDH1, CDK4, CDKN2A, " CHEK2, CTNNA1, DICER1, EPCAM, GREM1, HOXB13, KIT, MEN1, MLH1, MSH2, MSH3, MSH6, MUTYH, NBN, NF1, NTHL1, PALB2, PDGFRA, PMS2, POLD1, POLE, PTEN,RAD50, RAD51C, RAD51D, SDHA, SDHB, SDHC, SDHD, SMAD4, SMARCA4, STK11, TP53,TSC1, TSC2, and VHL.        We discussed that many of these genes are associated with specific hereditary cancer syndromes and published management guidelines: Hereditary Breast and Ovarian Cancer syndrome (BRCA1, BRCA2), Leija syndrome (MLH1, MSH2, MSH6, PMS2, EPCAM), Familial Adenomatous Polyposis (APC), Hereditary Diffuse Gastric Cancer (CDH1), Familial Atypical Multiple Mole Melanoma syndrome (CDK4, CDKN2A), Multiple Endocrine Neoplasia type 1 (MEN1), Juvenile Polyposis syndrome (BMPR1A, SMAD4), Cowden syndrome (PTEN), Li Fraumeni syndrome (TP53), Hereditary Paraganglioma and Pheochromocytoma syndrome (SDHA, SDHB, SDHC, SDHD), Peutz-Jeghers syndrome (STK11), MUTYH Associated Polyposis (MUTYH), Tuberous sclerosis complex (TSC1, TSC2), Von Hippel-Lindau disease (VHL), and Neurofibromatosis type 1 (NF1). The LEON, AXIN2, BRIP1, CHEK2, GREM1, MSH3, NBN, NTHL1, PALB2, POLD1, POLE, RAD51C, and RAD51D genes are associated with increased cancer risk and have published management guidelines for certain cancers. The remaining genes (BARD1, CTNNA1, DICER1, HOXB13, KIT, PDGFRA, RAD50, and SMARCA4) are associated with increased cancer risk and may allow us to make medical recommendations when mutations are identified.       Medical Management: For Baltazar, we reviewed that the information from genetic testing may determine:    additional cancer screening for which Baltazar may qualify (eg. continuation of prostate cancer screening, more frequent colonoscopies, more frequent dermatologic exams, etc.),     and targeted therapies for Baltazar, if he were to develop certain cancers in the future (eg. immunotherapy for individuals with Leija syndrome, PARP inhibitors for HBOC syndrome, etc.).       These recommendations and  possible targeted therapies will be discussed in detail once genetic testing is completed.  In addition, we will talk about about potential recommendations for Baltazar's family members at that time.    Plan:  1) Today, Baltazar decided to proceed with a BRCA1/BRCA2 genetic analysis with an automatic reflex to the Common Hereditary Cancers genetic testing panel through [facility].  Therefore, consent was reviewed and verbally obtained for this testing.    2) We reviewed the options for sample collection.  Baltazar plans to have his blood drawn at his local [facility]. Test results are expected to be available within 4-6 weeks of the blood draw.  3) Baltazar will either have a telephone visit or video visit to discuss the results.  Additional recommendations about screening and other medical management recommendations will be made at that time.      Marii Olivo MS, [facility]  Genetic Counselor  Ph: [phone]    Face to face time via video: 43 minutes

## 2022-05-26 NOTE — PROGRESS NOTES
This patient is on our lab schedule tomorrow for a genetic test, but is also requesting you put in orders for a std screening that he likes to get done yearly. Please advise/enter orders, thank you.

## 2022-05-26 NOTE — PROGRESS NOTES
Javad is a 72 year old who is being evaluated via a billable video visit.      Pt is in [place]    How would you like to obtain your AVS? MyChart  If the video visit is dropped, the invitation should be resent by: Send to e-mail at: [email or web address]  Will anyone else be joining your video visit? No      Video Start Time: 9:04AM    Video-Visit Details    Type of service:  Video Visit    Video End Time: 9:47AM    Originating Location (pt. Location): Home    Distant Location (provider location):  [facility]     Platform used for Video Visit: [employer] VF

## 2022-05-27 ENCOUNTER — LAB (OUTPATIENT)
Dept: LAB | Facility: CLINIC | Age: 72
End: 2022-05-27
Payer: COMMERCIAL

## 2022-05-27 ENCOUNTER — IMMUNIZATION (OUTPATIENT)
Dept: NURSING | Facility: CLINIC | Age: 72
End: 2022-05-27

## 2022-05-27 DIAGNOSIS — Z20.2 STD EXPOSURE: ICD-10-CM

## 2022-05-27 DIAGNOSIS — Z80.8 FAMILY HISTORY OF SKIN CANCER: ICD-10-CM

## 2022-05-27 DIAGNOSIS — Z80.3 FAMILY HISTORY OF MALIGNANT NEOPLASM OF BREAST: ICD-10-CM

## 2022-05-27 DIAGNOSIS — Z80.43 FAMILY HISTORY OF TESTICULAR CANCER: ICD-10-CM

## 2022-05-27 DIAGNOSIS — Z80.41 FAMILY HISTORY OF MALIGNANT NEOPLASM OF OVARY: ICD-10-CM

## 2022-05-27 PROCEDURE — 91305 COVID-19,PF,PFIZER (12+ YRS): CPT

## 2022-05-27 PROCEDURE — 0054A COVID-19,PF,PFIZER (12+ YRS): CPT

## 2022-05-27 PROCEDURE — 87591 N.GONORRHOEAE DNA AMP PROB: CPT

## 2022-05-27 PROCEDURE — 87491 CHLMYD TRACH DNA AMP PROBE: CPT

## 2022-05-27 PROCEDURE — 99000 SPECIMEN HANDLING OFFICE-LAB: CPT

## 2022-05-27 PROCEDURE — 36415 COLL VENOUS BLD VENIPUNCTURE: CPT

## 2022-05-28 LAB
C TRACH DNA SPEC QL PROBE+SIG AMP: NEGATIVE
N GONORRHOEA DNA SPEC QL NAA+PROBE: NEGATIVE

## 2022-06-10 LAB — SCANNED LAB RESULT: NORMAL

## 2022-07-06 ENCOUNTER — TELEPHONE (OUTPATIENT)
Dept: CONSULT | Facility: CLINIC | Age: 72
End: 2022-07-06

## 2022-07-06 ENCOUNTER — VIRTUAL VISIT (OUTPATIENT)
Dept: ONCOLOGY | Facility: CLINIC | Age: 72
End: 2022-07-06
Attending: GENETIC COUNSELOR, MS
Payer: COMMERCIAL

## 2022-07-06 DIAGNOSIS — Z80.41 FAMILY HISTORY OF MALIGNANT NEOPLASM OF OVARY: Primary | ICD-10-CM

## 2022-07-06 DIAGNOSIS — I77.89 AORTIC ROOT ENLARGEMENT (H): ICD-10-CM

## 2022-07-06 DIAGNOSIS — Z80.43 FAMILY HISTORY OF TESTICULAR CANCER: ICD-10-CM

## 2022-07-06 DIAGNOSIS — Z80.3 FAMILY HISTORY OF MALIGNANT NEOPLASM OF BREAST: ICD-10-CM

## 2022-07-06 DIAGNOSIS — Z80.8 FAMILY HISTORY OF SKIN CANCER: ICD-10-CM

## 2022-07-06 PROCEDURE — 999N000069 HC STATISTIC GENETIC COUNSELING, < 16 MIN: Mod: GT,95 | Performed by: GENETIC COUNSELOR, MS

## 2022-07-06 NOTE — LETTER
Cancer Risk Management  Program Locations    [facility]  [facility]  [facility]  Mailing Address  Cancer Risk Management Program  [address]  [place] [address]    New patient appointments  [phone]  July 6, 2022    Javad Nicholson II  [address] [facility] [address]      Dear Baltazar,    It was a pleasure talking with you via your virtual genetic counseling visit on 07/06/22.  Below is a copy of the progress note from that recent visit through the Cancer Risk Management Program.  If you have any additional questions, please feel free to contact me.   :[id number]}  Cancer Risk Management Program Genetic Counseling Note    7/6/2022    Referring Provider: Dr. Jed Hilario    Presenting Information:  Javad Nicholson II (Bill) had a return video visit through the Cancer Risk Management Program, in order to discuss his genetic testing results. His blood was drawn on 5-, and a Common Hereditary Cancers genetic testing panel was ordered through [facility]. This testing was done because of his family history of early onset ovarian cancer in Baltazar's sister, along with other cancers in the family.    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 This variant is called c.610C>T (also known as [id number]).  No other variants or mutations were found in the CTNNA1 gene. Given the uncertain significance of this result, medical management decisions should NOT be made based on this test result alone.    Of note, Baltazar tested negative for mutations (or variants of unknown significance) in the following genes:  APC, LEON, AXIN2, BARD1, BMPR1A, BRCA1, BRCA2, BRIP1, CDH1, CDK4, CDKN2A, CHEK2, DICER1, EPCAM, GREM1, HOXB13, KIT,  "MEN1, MLH1, MSH2, MSH3, MSH6, MUTYH, NBN, NF1, NTHL1, PALB2, PDGFRA, PMS2, POLD1, POLE, PTEN,RAD50, RAD51C, RAD51D, SDHA, SDHB, SDHC, SDHD, SMAD4, SMARCA4, STK11, TP53,TSC1, TSC2, and VHL.      Therefore, genetic testing did not detect an identifiable mutation associated with Hereditary Breast and Ovarian Cancer syndrome (BRCA1, BRCA2), Leija syndrome (MLH1, MSH2, MSH6, PMS2, EPCAM), Familial Adenomatous Polyposis (APC), Hereditary Diffuse Gastric Cancer (CDH1), Familial Atypical Multiple Mole Melanoma syndrome (CDK4, CDKN2A), Juvenile Polyposis syndrome (BMPR1A, SMAD4), Cowden syndrome (PTEN), Li Fraumeni syndrome (TP53), Peutz-Jeghers syndrome (STK11), MUTYH Associated Polyposis (MUTYH), Hereditary Paraganglioma and Pheochromocytoma syndrome (SDHA, SDHB, SDHC, SDHD), Tuberous sclerosis complex (TSC1, TSC2), Von Hippel-Lindau disease (VHL), Neurofibromatosis type 1 (NF1), and Multiple Endocrine Neoplasia type 1 (MEN1).: ***. We reviewed the autosomal dominant inheritance of these genes. Baltazar cannot pass on a mutation in any of these genes to his children based on this test result. Mutations in these genes do not skip generations.      A copy of the test report can be found in the Laboratory tab, dated 5-, and named \"LABORATORY MISCELLANEOUS ORDER.\"  The report is scanned in as a linked document.    Interpretation:  We discussed several different interpretations of this inconclusive test result. It is not clear if this variant in the CTNNA1 gene is associated with increased cancer risk:  1. This variant may be a benign change that does not increase cancer risk.  2. This variant may be a harmful mutation that causes an increased risk for diffuse gastric cancer (or other conditions).    Occasionally, a harmful mutation in the CTNNA1 gene has been associated with a diagnosis of Hereditary Diffuse Gastric Cancer (HDGC) syndrome.  HDGC syndrome is a rare hereditary cancer syndrome associated with increased risks " "for diffuse gastric cancer and female breast cancer.  Diffuse gastric cancer is a specific type of gastric cancer that infiltrates the stomach wall without forming a distinct mass. (Of note, we talked about that the average age of diagnosis of gastric cancer in this condition is the late 30s to the early 40s and that neither Baltazar nor his family history have any known history of gastric cancer.)  A few specific harmful mutations in the CTNNA1 gene (not the same variant as was seen in Baltazar) have been associated with a very rare genetic form of macular dystrophy with a particular \"butterfly\" pattern of pigment in the macular area; individuals with this condition has been described to have these eye findings by their \"middle age.\" (Of note, Baltazar had a recent ophthalmology exam, and there were no noted abnormal macular findings in that report.)       Again, it is not known at this time if the CTNNA1 gene variant seen in Baltazar is a harmful mutation or, instead, a benign variation of normal. However, there is not currently reported medical or family history for Baltazar that strongly suggests a CTNNA1-related condition in him or his family. Genetic testing labs are working to collect evidence about if this variant is harmful or benign, and they will contact me if it is reclassified. If this variant is determined to be a benign change, there may be a different gene or combination of genes and environment that are associated with the cancers in this family.    Lastly, we talked about that it is also important to consider that Baltazar's sister with early onset breast cancer may have had a mutation in one of the genes tested, but Baltazar did not inherit it.  Unfortunately, since Baltazar's sister is , she is not available for genetic testing.    Inheritance:  We reviewed the inheritance of this variant in the CTNNA1 gene. Baltazar had a 50% chance to pass on a copy of this variant to each of his biological children. Likewise, if Baltazar " "inherited this gene variant from one of his parents, each of his siblings would have a 50% risk of having a copy of the same variant. Because it is unclear what, if any, risk is associated with this variant, clinical genetic testing for this CTNNA1 gene variant alone is not recommended for relatives.    On the other hand, we discussed that for the genes that Baltazar tested negative for, we would expect that Baltazar did not pass a mutation in those genes to his biological children,  Mutations in these genes do not \"skip generations.\"    Screening:  We talked about that it is important for Baltazar and his relatives to refer back to the family history for appropriate cancer screening:      We discussed that there are not increased cancer screening recommendations at this time for Baltazar based either on his genetic testing results or on his family history.       Similarly, based on what is known about Baltazar and his side of the family, there are not current genetic testing or increased cancer screening recommendations for Baltazar's children.      Other population cancer screening options, such as those recommended by the American Cancer Society and the National Comprehensive Cancer Network (NCCN), are appropriate for Baltazar and his family.       With respect to Baltazar's niece (who is the daughter of his sister who  of early onset ovarian cancer): Females who have a close relative with ovarian cancer are at a slightly increased risk for developing ovarian cancer as compared to women with no family history; however, there are not specific screening recommendations currently for women with a close family family history of ovarian cancer, as effective screening methodologies for ovarian cancer are still to be developed.  Some females with a close family member with ovarian cancer are interested in potential oophorectomy, and those individuals should discussed the potential risks and benefits of that procedure with their gynecologic " provider.      Baltazar had questions today about recommended prostate cancer screening.  (The most recent PSA level noted in Baltazar's chart was in 2015 and was normal.) We talked about that current NCCN guidelines recommend that prostate screening through PSA levels be continued through age 75 (and that the frequency of that screening depends on a particular individual's observed PSA level.); PSA screening after age 75 could be considered, depending on a particular individual's health status.  I provided Baltazar with a copy of these recommendations.        Baltazar previously stated that he had a colonoscopy a few years ago and that a repeat was recommended for 10 years from that time.  NCCN guidelines recommended colonoscopy screening through age 75, as long as an individual is expected to have a life expectancy of 10 years or more.  (Colonoscopy later than age 75 can also be considered based on a particular person's health status.)      These screening recommendations may change if there are changes to Baltazar's personal and/or family history of cancer. Final screening recommendations should be made by each individual's primary care provider.    Additional Testing Considerations:  It is possible Baltazar does carry a gene or combination of genes and environment that increase his  risk for cancer that was not identifiable through this particular genetic testing panel. Baltazar is encouraged to contact me in the future if he wants to know if there is additional genetic testing that might be available/indicated for him then or if he wishes to readdress larger gene panel options in the future.     Although Baltazar's genetic testing result did not find a gene change known to be associated with ovarian cancer, other relatives may still carry a harmful gene mutation associated with an increased risk for cancer. We talked about that genetic counseling is recommended for Baltazar's niece (ie. the daughter of the Baltazar' sister with ovarian cancer), in  "order to discuss genetic testing options. If any of these relatives do pursue genetic testing, Baltazar is encouraged to contact me so that we may review the impact of their test results on him.    Summary:  We do not have an explanation for Baltazar's family history of cancer. While no genetic changes were identified, Baltazar may still be at risk for certain cancers due to family history, environmental factors, or other genetic causes not identified by this test.  Because of that, it is important that he continue with cancer screening based on his personal and family history as discussed above.    Genetic testing is rapidly advancing, and new cancer susceptibility genes will most likely be identified in the future. Therefore, I encouraged Baltazar to contact me annually or if there are changes in his personal or family history. This may change how we assess his cancer risk, screening, and the testing we would offer.    Plan:  1.  I released to Baltazar a copy of his test results through the [employer] portal.  He will also get a copy of today's clinic note and a handout about \"variant of unknown significance\" results.    2.  Baltazar  plans to follow-up with his primary care provider about his cancer screening recommendations.    3.  Baltazar should contact me regularly, or if his family history changes, and he would like to know if there are additional screening or testing recommendations at that time.    4.  I will contact Baltazar if the laboratory informs me that this VUS has been reclassified.  This may change screening and testing recommendations for Baltazar and his relatives.    5. Baltazar asked today about how best to coordinate genetic testing for Marfan syndrome in himself, given his personal history of a dilated aortic root and physical features possibility consistent with Marfan syndrome.  I connected with the [facility]etics Clinic staff about this, and I placed referral to them for this service.  They will contact Baltazar directly to " coordinate this additional genetic evaluation/testing.    If Baltazar has any further questions, I encouraged him to contact me via [employer] or through email: [email or web address].    Marii Olivo MS, [facility]  Genetic Counselor    Time spent face to face over video: 15 minutes

## 2022-07-06 NOTE — PATIENT INSTRUCTIONS
Genetic Testing  You had a blood test that looked at the genetic information in one or more genes associated with increased cancer risk.  The testing looked for any harmful changes that would stop this particular gene from working like it should.  It is important to remember that everyone has variants in multiple genes in their bodies that do not affect how the gene works.  These changes are benign and do not cause disease or increase cancer risk.  The term often used to describe these variants is benign polymorphism.  If an individual is found to have a benign polymorphism, their genetic test result is reported as Negative.    Results  There are three possible results of any genetic test:  Positive--a harmful mutation was identified  Negative--no mutation was identified  Variant of unknown significance--a variation in one of the genes was identified, but it is unclear how this impacts cancer risk in the family    Variant of Unknown Significance (VUS)  A VUS in CTNNA1 was identified in your blood sample.  Scientists currently do not know if this variant is harmful or if it is a benign polymorphism not associated with any increased risk of cancer.   There are several reasons for this lack of knowledge, but likely your VUS has either never been seen before or has only been seen in a small number of individuals.  Until your VUS is studied in more detail and/or seen in more families, scientists are not able to predict if it is a harmful mutation or a benign polymorphism.  Therefore, the cause of the cancer in your family is still unknown.          Reclassification  Genetic testing laboratories and researchers are constantly working to determine the importance of variants of unknown significance.  Most laboratories will notify the genetic counselor when a patient s VUS has been reclassified as a harmful mutation or a benign polymorphism.      Some families may be candidates for participation in the laboratory s  variant research programs to help determine the importance of their VUS.  Only the testing laboratory can decide who is eligible for participation.  Participating in these programs does not guarantee that families will learn the significance of their VUS immediately.  It could be months or years before a VUS is reclassified.       Screening Recommendations  Cancer screening recommendations for patients with a VUS should be based on their personal and family history rather than the VUS itself.  This may include increased cancer screening for certain individuals in the family.  Your genetic counselor and health care provider can help make appropriate recommendations for you and your relatives.      It is usually not recommended that other relatives have genetic testing for the VUS unless it is done as part of the laboratory s variant research program because an individual s test results should not influence their cancer screening until we determine the importance of the VUS.  Your genetic counselor can help you and your relatives understand the risks and benefits of all genetic testing and cancer screening options.    Please call us if you have any questions or concerns.     Cancer Risk Management Program [phone]-CANCER ([phone])  Massimo Linda, MS [facility]  [phone]  Kimmy Ewing, MS, [facility] [phone]  Marii Olivo, MS, [facility]  [phone]   [email or web address]  Norma Diaz, MS, [facility]  [phone]  Nitza Elder, MS, [facility]  [phone]  Harmony Nathan, MS, [facility] [phone]  Lazara Gamez, MS, [facility] [phone]

## 2022-07-06 NOTE — PROGRESS NOTES
Javad is a 72 year old who is being evaluated via a billable video visit.      How would you like to obtain your AVS? MyChart  If the video visit is dropped, the invitation should be resent by: Text to cell phone: [phone]  Will anyone else be joining your video visit? No        Video-Visit Details    Video Start Time: 10:17AM    Type of service:  Video Visit    Video End Time: 10:32AM    Originating Location (pt. Location): Home    Distant Location (provider location):  [facility] CANCER [facility]     Platform used for Video Visit: Luke Olivo MS, [facility]  Genetic Counselor

## 2022-07-06 NOTE — LETTER
7/6/2022         RE: Javad Nicholson II  [address]  [facility] [address]        Dear Colleague,    Thank you for referring your patient, Javad Nicholson II, to the [facility]. Please see a copy of my visit note below.    Javad is a 72 year old who is being evaluated via a billable video visit.      How would you like to obtain your AVS? MyChart  If the video visit is dropped, the invitation should be resent by: Text to cell phone: [phone]  Will anyone else be joining your video visit? No        Video-Visit Details    Video Start Time: 10:17AM    Type of service:  Video Visit    Video End Time: 10:32AM    Originating Location (pt. Location): Home    Distant Location (provider location):  [facility]     Platform used for Video Visit: Luke Olivo MS, [facility]  Genetic Counselor        Cancer Risk Management Program Genetic Counseling Note    7/6/2022    Referring Provider: Dr. Jed Hilario    Presenting Information:  Javad Nicholson II (Bill) had a return video visit through the Cancer Risk Management Program, in order to discuss his genetic testing results. His blood was drawn on 5-, and a Common Hereditary Cancers genetic testing panel was ordered through [facility]. This testing was done because of his family history of early onset ovarian cancer in Baltazar's sister, along with other cancers in the family.    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 This variant is called c.610C>T (also known as p.[id number]).  No other variants or mutations were found in the CTNNA1 gene. Given the uncertain significance of this result, medical management decisions should NOT be made based on this test result alone.    Of note, Baltazar tested negative for mutations (or variants of unknown significance) in the following genes:  APC, LEON, AXIN2,  "BARD1, BMPR1A, BRCA1, BRCA2, BRIP1, CDH1, CDK4, CDKN2A, CHEK2, DICER1, EPCAM, GREM1, HOXB13, KIT, MEN1, MLH1, MSH2, MSH3, MSH6, MUTYH, NBN, NF1, NTHL1, PALB2, PDGFRA, PMS2, POLD1, POLE, PTEN,RAD50, RAD51C, RAD51D, SDHA, SDHB, SDHC, SDHD, SMAD4, SMARCA4, STK11, TP53,TSC1, TSC2, and VHL.      Therefore, genetic testing did not detect an identifiable mutation associated with Hereditary Breast and Ovarian Cancer syndrome (BRCA1, BRCA2), Leija syndrome (MLH1, MSH2, MSH6, PMS2, EPCAM), Familial Adenomatous Polyposis (APC), Hereditary Diffuse Gastric Cancer (CDH1), Familial Atypical Multiple Mole Melanoma syndrome (CDK4, CDKN2A), Juvenile Polyposis syndrome (BMPR1A, SMAD4), Cowden syndrome (PTEN), Li Fraumeni syndrome (TP53), Peutz-Jeghers syndrome (STK11), MUTYH Associated Polyposis (MUTYH), Hereditary Paraganglioma and Pheochromocytoma syndrome (SDHA, SDHB, SDHC, SDHD), Tuberous sclerosis complex (TSC1, TSC2), Von Hippel-Lindau disease (VHL), Neurofibromatosis type 1 (NF1), and Multiple Endocrine Neoplasia type 1 (MEN1).    A copy of the test report can be found in the Laboratory tab, dated 5-, and named \"LABORATORY MISCELLANEOUS ORDER.\"  The report is scanned in as a linked document.    Interpretation:  We discussed several different interpretations of this inconclusive test result. It is not clear if this variant in the CTNNA1 gene is associated with increased cancer risk:  1. This variant may be a benign change that does not increase cancer risk.  2. This variant may be a harmful mutation that causes an increased risk for diffuse gastric cancer (or other conditions).    Occasionally, a harmful mutation in the CTNNA1 gene has been associated with a diagnosis of Hereditary Diffuse Gastric Cancer (HDGC) syndrome.  HDGC syndrome is a rare hereditary cancer syndrome associated with increased risks for diffuse gastric cancer and female breast cancer.  Diffuse gastric cancer is a specific type of gastric cancer that " "infiltrates the stomach wall without forming a distinct mass. (Of note, we talked about that the average age of diagnosis of gastric cancer in this condition is the late 30s to the early 40s and that neither Baltazar nor his family history have any known history of gastric cancer.)  A few specific harmful mutations in the CTNNA1 gene (not the same variant as was seen in Baltazar) have been associated with a very rare genetic form of macular dystrophy with a particular \"butterfly\" pattern of pigment in the macular area; individuals with this condition has been described to have these eye findings by their \"middle age.\" (Of note, Baltazar had a recent ophthalmology exam, and there were no noted abnormal macular findings in that report.)       Again, it is not known at this time if the CTNNA1 gene variant seen in Baltazar is a harmful mutation or, instead, a benign variation of normal. However, there is not currently reported medical or family history for Baltazar that strongly suggests a CTNNA1-related condition in him or his family. Genetic testing labs are working to collect evidence about if this variant is harmful or benign, and they will contact me if it is reclassified. If this variant is determined to be a benign change, there may be a different gene or combination of genes and environment that are associated with the cancers in this family.    Lastly, we talked about that it is also important to consider that Baltazar's sister with early onset breast cancer may have had a mutation in one of the genes tested, but Baltazar did not inherit it.  Unfortunately, since Baltazar's sister is , she is not available for genetic testing.    Inheritance:  We reviewed the inheritance of this variant in the CTNNA1 gene. Baltazar had a 50% chance to pass on a copy of this variant to each of his biological children. Likewise, if Baltazar inherited this gene variant from one of his parents, each of his siblings would have a 50% risk of having a copy of the " "same variant. Because it is unclear what, if any, risk is associated with this variant, clinical genetic testing for this CTNNA1 gene variant alone is not recommended for relatives.    On the other hand, we discussed that for the genes that Baltazar tested negative for, we would expect that Baltazar did not pass a mutation in those genes to his biological children,  Mutations in these genes do not \"skip generations.\"    Screening:  We talked about that it is important for Baltazar and his relatives to refer back to the family history for appropriate cancer screening:      We discussed that there are not increased cancer screening recommendations at this time for Baltazar based either on his genetic testing results or on his family history.       Similarly, based on what is known about Baltazar and his side of the family, there are not current genetic testing or increased cancer screening recommendations for Baltazar's children.      Other population cancer screening options, such as those recommended by the American Cancer Society and the National Comprehensive Cancer Network (NCCN), are appropriate for Baltazar and his family.       With respect to Baltazar's niece (who is the daughter of his sister who  of early onset ovarian cancer): Females who have a close relative with ovarian cancer are at a slightly increased risk for developing ovarian cancer as compared to women with no family history; however, there are not specific screening recommendations currently for women with a close family family history of ovarian cancer, as effective screening methodologies for ovarian cancer are still to be developed.  Some females with a close family member with ovarian cancer are interested in potential oophorectomy, and those individuals should discussed the potential risks and benefits of that procedure with their gynecologic provider.      Baltazar had questions today about recommended prostate cancer screening.  (The most recent PSA level noted in " Baltazar's chart was in 2015 and was normal.) We talked about that current NCCN guidelines recommend that prostate screening through PSA levels be continued through age 75 (and that the frequency of that screening depends on a particular individual's observed PSA level.); PSA screening after age 75 could be considered, depending on a particular individual's health status.  I provided Baltazar with a copy of these recommendations.        Baltazar previously stated that he had a colonoscopy a few years ago and that a repeat was recommended for 10 years from that time.  NCCN guidelines recommended colonoscopy screening through age 75, as long as an individual is expected to have a life expectancy of 10 years or more.  (Colonoscopy later than age 75 can also be considered based on a particular person's health status.)      These screening recommendations may change if there are changes to Balatzar's personal and/or family history of cancer. Final screening recommendations should be made by each individual's primary care provider.    Additional Testing Considerations:  It is possible Baltazar does carry a gene or combination of genes and environment that increase his  risk for cancer that was not identifiable through this particular genetic testing panel. Baltazar is encouraged to contact me in the future if he wants to know if there is additional genetic testing that might be available/indicated for him then or if he wishes to readdress larger gene panel options in the future.     Although Baltazar's genetic testing result did not find a gene change known to be associated with ovarian cancer, other relatives may still carry a harmful gene mutation associated with an increased risk for cancer. We talked about that genetic counseling is recommended for Baltazar's niece (ie. the daughter of the Baltazar' sister with ovarian cancer), in order to discuss genetic testing options. If any of these relatives do pursue genetic testing, Baltazar is encouraged to contact  "me so that we may review the impact of their test results on him.    Summary:  We do not have an explanation for Baltazar's family history of cancer. While no genetic changes were identified, Baltazar may still be at risk for certain cancers due to family history, environmental factors, or other genetic causes not identified by this test.  Because of that, it is important that he continue with cancer screening based on his personal and family history as discussed above.    Genetic testing is rapidly advancing, and new cancer susceptibility genes will most likely be identified in the future. Therefore, I encouraged Baltazar to contact me annually or if there are changes in his personal or family history. This may change how we assess his cancer risk, screening, and the testing we would offer.    Plan:  1.  I released to Baltazar a copy of his test results through the [facility] portal.  He will also get a copy of today's clinic note and a handout about \"variant of unknown significance\" results.    2.  Baltazar  plans to follow-up with his primary care provider about his cancer screening recommendations.    3.  Baltazar should contact me regularly, or if his family history changes, and he would like to know if there are additional screening or testing recommendations at that time.    4.  I will contact Baltazar if the laboratory informs me that this VUS has been reclassified.  This may change screening and testing recommendations for Baltazar and his relatives.    5. Baltazar asked today about how best to coordinate genetic testing for Marfan syndrome in himself, given his personal history of a dilated aortic root and physical features possibility consistent with Marfan syndrome.  I connected with the [facility] staff about this, and I placed referral to them for this service.  They will contact Baltazar directly to coordinate this additional genetic evaluation/testing.    If Baltazar has any further questions, I encouraged him to contact me via " [employer] or through email: [email or web address].    Time spent face to face over video: 15 minutes        Again, thank you for allowing me to participate in the care of your patient.      Sincerely,    Letitia Olivo GC

## 2022-07-06 NOTE — LETTER
7/6/2022      RE: Javad Nicholson II  [address]  [facility] [address]       Javad is a 72 year old who is being evaluated via a billable video visit.      How would you like to obtain your AVS? MyChart  If the video visit is dropped, the invitation should be resent by: Text to cell phone: [phone]  Will anyone else be joining your video visit? No        Video-Visit Details    Video Start Time: 10:17AM    Type of service:  Video Visit    Video End Time: 10:32AM    Originating Location (pt. Location): Home    Distant Location (provider location):  [facility]     Platform used for Video Visit: Luke Olivo MS, [facility]  Genetic Counselor        Cancer Risk Management Program Genetic Counseling Note    7/6/2022    Referring Provider: Dr. Jed Hilario    Presenting Information:  Javad Nicholson II (Bill) had a return video visit through the Cancer Risk Management Program, in order to discuss his genetic testing results. His blood was drawn on 5-, and a Common Hereditary Cancers genetic testing panel was ordered through [employer]. This testing was done because of his family history of early onset ovarian cancer in Baltazar's sister, along with other cancers in the family.    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 This variant is called c.610C>T (also known as p.[id number]).  No other variants or mutations were found in the CTNNA1 gene. Given the uncertain significance of this result, medical management decisions should NOT be made based on this test result alone.    Of note, Baltazar tested negative for mutations (or variants of unknown significance) in the following genes:  APC, LEON, AXIN2, BARD1, BMPR1A, BRCA1, BRCA2, BRIP1, CDH1, CDK4, CDKN2A, CHEK2, DICER1, EPCAM, GREM1, HOXB13, KIT, MEN1, MLH1, MSH2, MSH3, MSH6, MUTYH, NBN, NF1, NTHL1, PALB2, PDGFRA, PMS2, POLD1,  "POLE, PTEN,RAD50, RAD51C, RAD51D, SDHA, SDHB, SDHC, SDHD, SMAD4, SMARCA4, STK11, TP53,TSC1, TSC2, and VHL.      Therefore, genetic testing did not detect an identifiable mutation associated with Hereditary Breast and Ovarian Cancer syndrome (BRCA1, BRCA2), Leija syndrome (MLH1, MSH2, MSH6, PMS2, EPCAM), Familial Adenomatous Polyposis (APC), Hereditary Diffuse Gastric Cancer (CDH1), Familial Atypical Multiple Mole Melanoma syndrome (CDK4, CDKN2A), Juvenile Polyposis syndrome (BMPR1A, SMAD4), Cowden syndrome (PTEN), Li Fraumeni syndrome (TP53), Peutz-Jeghers syndrome (STK11), MUTYH Associated Polyposis (MUTYH), Hereditary Paraganglioma and Pheochromocytoma syndrome (SDHA, SDHB, SDHC, SDHD), Tuberous sclerosis complex (TSC1, TSC2), Von Hippel-Lindau disease (VHL), Neurofibromatosis type 1 (NF1), and Multiple Endocrine Neoplasia type 1 (MEN1).    A copy of the test report can be found in the Laboratory tab, dated 5-, and named \"LABORATORY MISCELLANEOUS ORDER.\"  The report is scanned in as a linked document.    Interpretation:  We discussed several different interpretations of this inconclusive test result. It is not clear if this variant in the CTNNA1 gene is associated with increased cancer risk:  1. This variant may be a benign change that does not increase cancer risk.  2. This variant may be a harmful mutation that causes an increased risk for diffuse gastric cancer (or other conditions).    Occasionally, a harmful mutation in the CTNNA1 gene has been associated with a diagnosis of Hereditary Diffuse Gastric Cancer (HDGC) syndrome.  HDGC syndrome is a rare hereditary cancer syndrome associated with increased risks for diffuse gastric cancer and female breast cancer.  Diffuse gastric cancer is a specific type of gastric cancer that infiltrates the stomach wall without forming a distinct mass. (Of note, we talked about that the average age of diagnosis of gastric cancer in this condition is the late 30s to the " "early 40s and that neither Baltazar nor his family history have any known history of gastric cancer.)  A few specific harmful mutations in the CTNNA1 gene (not the same variant as was seen in Baltazar) have been associated with a very rare genetic form of macular dystrophy with a particular \"butterfly\" pattern of pigment in the macular area; individuals with this condition has been described to have these eye findings by their \"middle age.\" (Of note, Baltazar had a recent ophthalmology exam, and there were no noted abnormal macular findings in that report.)       Again, it is not known at this time if the CTNNA1 gene variant seen in Baltazar is a harmful mutation or, instead, a benign variation of normal. However, there is not currently reported medical or family history for Baltazar that strongly suggests a CTNNA1-related condition in him or his family. Genetic testing labs are working to collect evidence about if this variant is harmful or benign, and they will contact me if it is reclassified. If this variant is determined to be a benign change, there may be a different gene or combination of genes and environment that are associated with the cancers in this family.    Lastly, we talked about that it is also important to consider that Baltazar's sister with early onset breast cancer may have had a mutation in one of the genes tested, but Baltazar did not inherit it.  Unfortunately, since Baltazar's sister is , she is not available for genetic testing.    Inheritance:  We reviewed the inheritance of this variant in the CTNNA1 gene. Baltazar had a 50% chance to pass on a copy of this variant to each of his biological children. Likewise, if Baltazar inherited this gene variant from one of his parents, each of his siblings would have a 50% risk of having a copy of the same variant. Because it is unclear what, if any, risk is associated with this variant, clinical genetic testing for this CTNNA1 gene variant alone is not recommended for " "relatives.    On the other hand, we discussed that for the genes that Baltazar tested negative for, we would expect that Baltazar did not pass a mutation in those genes to his biological children,  Mutations in these genes do not \"skip generations.\"    Screening:  We talked about that it is important for Baltazar and his relatives to refer back to the family history for appropriate cancer screening:      We discussed that there are not increased cancer screening recommendations at this time for Baltazar based either on his genetic testing results or on his family history.       Similarly, based on what is known about Baltazar and his side of the family, there are not current genetic testing or increased cancer screening recommendations for Baltazar's children.      Other population cancer screening options, such as those recommended by the American Cancer Society and the National Comprehensive Cancer Network (NCCN), are appropriate for Baltazar and his family.       With respect to Baltazar's niece (who is the daughter of his sister who  of early onset ovarian cancer): Females who have a close relative with ovarian cancer are at a slightly increased risk for developing ovarian cancer as compared to women with no family history; however, there are not specific screening recommendations currently for women with a close family family history of ovarian cancer, as effective screening methodologies for ovarian cancer are still to be developed.  Some females with a close family member with ovarian cancer are interested in potential oophorectomy, and those individuals should discussed the potential risks and benefits of that procedure with their gynecologic provider.      Baltazar had questions today about recommended prostate cancer screening.  (The most recent PSA level noted in Baltazar's chart was in  and was normal.) We talked about that current NCCN guidelines recommend that prostate screening through PSA levels be continued through age 75 (and " that the frequency of that screening depends on a particular individual's observed PSA level.); PSA screening after age 75 could be considered, depending on a particular individual's health status.  I provided Baltazar with a copy of these recommendations.        Baltazar previously stated that he had a colonoscopy a few years ago and that a repeat was recommended for 10 years from that time.  NCCN guidelines recommended colonoscopy screening through age 75, as long as an individual is expected to have a life expectancy of 10 years or more.  (Colonoscopy later than age 75 can also be considered based on a particular person's health status.)      These screening recommendations may change if there are changes to Baltazar's personal and/or family history of cancer. Final screening recommendations should be made by each individual's primary care provider.    Additional Testing Considerations:  It is possible Baltazar does carry a gene or combination of genes and environment that increase his  risk for cancer that was not identifiable through this particular genetic testing panel. Baltazar is encouraged to contact me in the future if he wants to know if there is additional genetic testing that might be available/indicated for him then or if he wishes to readdress larger gene panel options in the future.     Although Baltazar's genetic testing result did not find a gene change known to be associated with ovarian cancer, other relatives may still carry a harmful gene mutation associated with an increased risk for cancer. We talked about that genetic counseling is recommended for Baltazar's niece (ie. the daughter of the Baltazar' sister with ovarian cancer), in order to discuss genetic testing options. If any of these relatives do pursue genetic testing, Baltazar is encouraged to contact me so that we may review the impact of their test results on him.    Summary:  We do not have an explanation for Baltazar's family history of cancer. While no genetic changes  "were identified, Baltazar may still be at risk for certain cancers due to family history, environmental factors, or other genetic causes not identified by this test.  Because of that, it is important that he continue with cancer screening based on his personal and family history as discussed above.    Genetic testing is rapidly advancing, and new cancer susceptibility genes will most likely be identified in the future. Therefore, I encouraged Baltazar to contact me annually or if there are changes in his personal or family history. This may change how we assess his cancer risk, screening, and the testing we would offer.    Plan:  1.  I released to Baltazar a copy of his test results through the [employer] portal.  He will also get a copy of today's clinic note and a handout about \"variant of unknown significance\" results.    2.  Baltazar  plans to follow-up with his primary care provider about his cancer screening recommendations.    3.  Baltazar should contact me regularly, or if his family history changes, and he would like to know if there are additional screening or testing recommendations at that time.    4.  I will contact Baltazar if the laboratory informs me that this VUS has been reclassified.  This may change screening and testing recommendations for Baltazar and his relatives.    5. Baltazar asked today about how best to coordinate genetic testing for Marfan syndrome in himself, given his personal history of a dilated aortic root and physical features possibility consistent with Marfan syndrome.  I connected with the [facility] staff about this, and I placed referral to them for this service.  They will contact Baltazar directly to coordinate this additional genetic evaluation/testing.    If Baltazar has any further questions, I encouraged him to contact me via [employer] or through email: [email or web address].    Marii Olivo MS, [facility]  Genetic Counselor    Time spent face to face over video: 15 minutes          Letitia Olivo, " GC

## 2022-07-06 NOTE — TELEPHONE ENCOUNTER
LVM for patient to call back to schedule new patient Genetics appointment with Dr. Melendez, Dr. Newberry, Dr. Pennington, Dr. Gardner, or Dr. Hodgson, with GC visit prior. When patient calls back, please assist in scheduling appointment. If scheduling with Dr. Melendez, schedule in person visit, otherwise video or in person visit OK but please inform patient that appointment type may be changed at provider's discretion. Please advise patient to complete intake form via [employer]. Thank you.

## 2022-07-06 NOTE — PROGRESS NOTES
Cancer Risk Management Program Genetic Counseling Note    7/6/2022    Referring Provider: Dr. Jed Hilario    Presenting Information:  Javad Nicholson II (Bill) had a return video visit through the Cancer Risk Management Program, in order to discuss his genetic testing results. His blood was drawn on 5-, and a Common Hereditary Cancers genetic testing panel was ordered through [facility]. This testing was done because of his family history of early onset ovarian cancer in Baltazar's sister, along with other cancers in the family.    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 This variant is called c.610C>T (also known as p.[id number]).  No other variants or mutations were found in the CTNNA1 gene. Given the uncertain significance of this result, medical management decisions should NOT be made based on this test result alone.    Of note, Baltazar tested negative for mutations (or variants of unknown significance) in the following genes:  APC, LEON, AXIN2, BARD1, BMPR1A, BRCA1, BRCA2, BRIP1, CDH1, CDK4, CDKN2A, CHEK2, DICER1, EPCAM, GREM1, HOXB13, KIT, MEN1, MLH1, MSH2, MSH3, MSH6, MUTYH, NBN, NF1, NTHL1, PALB2, PDGFRA, PMS2, POLD1, POLE, PTEN,RAD50, RAD51C, RAD51D, SDHA, SDHB, SDHC, SDHD, SMAD4, SMARCA4, STK11, TP53,TSC1, TSC2, and VHL.      Therefore, genetic testing did not detect an identifiable mutation associated with Hereditary Breast and Ovarian Cancer syndrome (BRCA1, BRCA2), Leija syndrome (MLH1, MSH2, MSH6, PMS2, EPCAM), Familial Adenomatous Polyposis (APC), Hereditary Diffuse Gastric Cancer (CDH1), Familial Atypical Multiple Mole Melanoma syndrome (CDK4, CDKN2A), Juvenile Polyposis syndrome (BMPR1A, SMAD4), Cowden syndrome (PTEN), Li Fraumeni syndrome (TP53), Peutz-Jeghers syndrome (STK11), MUTYH Associated Polyposis (MUTYH), Hereditary Paraganglioma and Pheochromocytoma syndrome (SDHA, SDHB, SDHC, SDHD),  "Tuberous sclerosis complex (TSC1, TSC2), Von Hippel-Lindau disease (VHL), Neurofibromatosis type 1 (NF1), and Multiple Endocrine Neoplasia type 1 (MEN1).    A copy of the test report can be found in the Laboratory tab, dated 5-, and named \"LABORATORY MISCELLANEOUS ORDER.\"  The report is scanned in as a linked document.    Interpretation:  We discussed several different interpretations of this inconclusive test result. It is not clear if this variant in the CTNNA1 gene is associated with increased cancer risk:  1. This variant may be a benign change that does not increase cancer risk.  2. This variant may be a harmful mutation that causes an increased risk for diffuse gastric cancer (or other conditions).    Occasionally, a harmful mutation in the CTNNA1 gene has been associated with a diagnosis of Hereditary Diffuse Gastric Cancer (HDGC) syndrome.  HDGC syndrome is a rare hereditary cancer syndrome associated with increased risks for diffuse gastric cancer and female breast cancer.  Diffuse gastric cancer is a specific type of gastric cancer that infiltrates the stomach wall without forming a distinct mass. (Of note, we talked about that the average age of diagnosis of gastric cancer in this condition is the late 30s to the early 40s and that neither Baltazar nor his family history have any known history of gastric cancer.)  A few specific harmful mutations in the CTNNA1 gene (not the same variant as was seen in Baltazar) have been associated with a very rare genetic form of macular dystrophy with a particular \"butterfly\" pattern of pigment in the macular area; individuals with this condition has been described to have these eye findings by their \"middle age.\" (Of note, Baltazar had a recent ophthalmology exam, and there were no noted abnormal macular findings in that report.)       Again, it is not known at this time if the CTNNA1 gene variant seen in Baltazar is a harmful mutation or, instead, a benign variation of normal. " "However, there is not currently reported medical or family history for Baltazar that strongly suggests a CTNNA1-related condition in him or his family. Genetic testing labs are working to collect evidence about if this variant is harmful or benign, and they will contact me if it is reclassified. If this variant is determined to be a benign change, there may be a different gene or combination of genes and environment that are associated with the cancers in this family.    Lastly, we talked about that it is also important to consider that Baltazar's sister with early onset breast cancer may have had a mutation in one of the genes tested, but Baltazar did not inherit it.  Unfortunately, since Baltazar's sister is , she is not available for genetic testing.    Inheritance:  We reviewed the inheritance of this variant in the CTNNA1 gene. Baltazar had a 50% chance to pass on a copy of this variant to each of his biological children. Likewise, if Baltazar inherited this gene variant from one of his parents, each of his siblings would have a 50% risk of having a copy of the same variant. Because it is unclear what, if any, risk is associated with this variant, clinical genetic testing for this CTNNA1 gene variant alone is not recommended for relatives.    On the other hand, we discussed that for the genes that Baltazar tested negative for, we would expect that Baltazar did not pass a mutation in those genes to his biological children,  Mutations in these genes do not \"skip generations.\"    Screening:  We talked about that it is important for Baltazar and his relatives to refer back to the family history for appropriate cancer screening:      We discussed that there are not increased cancer screening recommendations at this time for Baltazar based either on his genetic testing results or on his family history.       Similarly, based on what is known about Baltazar and his side of the family, there are not current genetic testing or increased cancer screening " recommendations for Baltazar's children.      Other population cancer screening options, such as those recommended by the American Cancer Society and the National Comprehensive Cancer Network (NCCN), are appropriate for Baltazar and his family.       With respect to Baltazar's niece (who is the daughter of his sister who  of early onset ovarian cancer): Females who have a close relative with ovarian cancer are at a slightly increased risk for developing ovarian cancer as compared to women with no family history; however, there are not specific screening recommendations currently for women with a close family family history of ovarian cancer, as effective screening methodologies for ovarian cancer are still to be developed.  Some females with a close family member with ovarian cancer are interested in potential oophorectomy, and those individuals should discussed the potential risks and benefits of that procedure with their gynecologic provider.      Baltazar had questions today about recommended prostate cancer screening.  (The most recent PSA level noted in Baltazar's chart was in  and was normal.) We talked about that current NCCN guidelines recommend that prostate screening through PSA levels be continued through age 75 (and that the frequency of that screening depends on a particular individual's observed PSA level.); PSA screening after age 75 could be considered, depending on a particular individual's health status.  I provided Baltazar with a copy of these recommendations.        Baltazar previously stated that he had a colonoscopy a few years ago and that a repeat was recommended for 10 years from that time.  NCCN guidelines recommended colonoscopy screening through age 75, as long as an individual is expected to have a life expectancy of 10 years or more.  (Colonoscopy later than age 75 can also be considered based on a particular person's health status.)      These screening recommendations may change if there are changes to  Baltazar's personal and/or family history of cancer. Final screening recommendations should be made by each individual's primary care provider.    Additional Testing Considerations:  It is possible Baltazar does carry a gene or combination of genes and environment that increase his  risk for cancer that was not identifiable through this particular genetic testing panel. Baltazar is encouraged to contact me in the future if he wants to know if there is additional genetic testing that might be available/indicated for him then or if he wishes to readdress larger gene panel options in the future.     Although Baltazar's genetic testing result did not find a gene change known to be associated with ovarian cancer, other relatives may still carry a harmful gene mutation associated with an increased risk for cancer. We talked about that genetic counseling is recommended for Baltazar's niece (ie. the daughter of the Baltazar' sister with ovarian cancer), in order to discuss genetic testing options. If any of these relatives do pursue genetic testing, Baltazar is encouraged to contact me so that we may review the impact of their test results on him.    Summary:  We do not have an explanation for Baltazar's family history of cancer. While no genetic changes were identified, Baltazar may still be at risk for certain cancers due to family history, environmental factors, or other genetic causes not identified by this test.  Because of that, it is important that he continue with cancer screening based on his personal and family history as discussed above.    Genetic testing is rapidly advancing, and new cancer susceptibility genes will most likely be identified in the future. Therefore, I encouraged Baltazar to contact me annually or if there are changes in his personal or family history. This may change how we assess his cancer risk, screening, and the testing we would offer.    Plan:  1.  I released to Baltazar a copy of his test results through the [employer] portal.  He  "will also get a copy of today's clinic note and a handout about \"variant of unknown significance\" results.    2.  Baltazar  plans to follow-up with his primary care provider about his cancer screening recommendations.    3.  Baltazar should contact me regularly, or if his family history changes, and he would like to know if there are additional screening or testing recommendations at that time.    4.  I will contact Baltazar if the laboratory informs me that this VUS has been reclassified.  This may change screening and testing recommendations for Baltazar and his relatives.    5. Baltazar asked today about how best to coordinate genetic testing for Marfan syndrome in himself, given his personal history of a dilated aortic root and physical features possibility consistent with Marfan syndrome.  I connected with the [facility]etics Clinic staff about this, and I placed referral to them for this service.  They will contact Baltazar directly to coordinate this additional genetic evaluation/testing.    If Baltazar has any further questions, I encouraged him to contact me via [employer] or through email: [email or web address].    Marii Olivo MS, [facility]  Genetic Counselor    Time spent face to face over video: 15 minutes      "

## 2022-09-08 ENCOUNTER — VIRTUAL VISIT (OUTPATIENT)
Dept: FAMILY MEDICINE | Facility: CLINIC | Age: 72
End: 2022-09-08
Payer: COMMERCIAL

## 2022-09-08 DIAGNOSIS — U07.1 INFECTION DUE TO 2019 NOVEL CORONAVIRUS: Primary | ICD-10-CM

## 2022-09-08 PROCEDURE — 99213 OFFICE O/P EST LOW 20 MIN: CPT | Mod: CS | Performed by: FAMILY MEDICINE

## 2022-09-08 RX ORDER — BRINZOLAMIDE/BRIMONIDINE TARTRATE 10; 2 MG/ML; MG/ML
SUSPENSION/ DROPS OPHTHALMIC
COMMUNITY
Start: 2022-08-23

## 2022-09-08 RX ORDER — ATORVASTATIN CALCIUM 10 MG/1
10 TABLET, FILM COATED ORAL DAILY
COMMUNITY
Start: 2022-07-07 | End: 2023-08-17

## 2022-09-08 RX ORDER — ACETAMINOPHEN 500 MG
500-1000 TABLET ORAL EVERY 4 HOURS PRN
Qty: 30 TABLET | Refills: 0 | Status: SHIPPED | OUTPATIENT
Start: 2022-09-08 | End: 2023-08-17

## 2022-09-08 NOTE — PROGRESS NOTES
Javad is a 72 year old who is being evaluated via a billable telephone visit.      What phone number would you like to be contacted at? [phone]  How would you like to obtain your AVS? MyChart    Assessment & Plan   Infection due to 2019 novel coronavirus  Meets risk criteria.  It is recommended that he hold his a atorvastatin for the 5 days that he is on the medication  - acetaminophen (TYLENOL) 500 MG tablet  Dispense: 30 tablet; Refill: 0  - nirmatrelvir and ritonavir (PAXLOVID) therapy pack  Dispense: 30 each; Refill: 0        Return in about 1 week (around 9/15/2022) for symptoms failing to improve or sooner if worsening.      Glen Trotter MD      [address] [address]  [place]   Office: [phone]       Subjective   Javad is a 72 year old, presenting for the following health issues:  Telephone      HPI       COVID-19 Symptom Review  How many days ago did these symptoms start? Sx 3-4 days ago,  tested + last night     Are any of the following symptoms significant for you?    New or worsening difficulty breathing? No    Worsening cough? Yes, it's a dry cough.     Fever or chills? Yes, I felt feverish or had chills.    Headache: YES    Sore throat: YES    Chest pain: No    Diarrhea: No    Body aches? YES    What treatments has patient tried? Nonsteroidals and Cough drops    Does patient live in a nursing home, group home, or shelter? No  Does patient have a way to get food/medications during quarantined? Yes, I have a friend or family member who can help me.    Review of Systems   Constitutional, HEENT, cardiovascular, pulmonary, gi and gu systems are negative, except as otherwise noted.      Objective           Vitals:  No vitals were obtained today due to virtual visit.    Physical Exam   healthy, alert and no distress  PSYCH: Alert and oriented times 3; coherent speech, normal   rate and volume, able to articulate logical thoughts, able    to abstract reason, no tangential thoughts, no hallucinations   or delusions  His affect is normal  RESP: No cough, no audible wheezing, able to talk in full sentences  Remainder of exam unable to be completed due to telephone visits          Phone call duration: 5 minutes

## 2022-09-08 NOTE — PATIENT INSTRUCTIONS

## 2022-10-15 ENCOUNTER — HEALTH MAINTENANCE LETTER (OUTPATIENT)
Age: 72
End: 2022-10-15

## 2022-12-13 ENCOUNTER — OFFICE VISIT (OUTPATIENT)
Dept: FAMILY MEDICINE | Facility: CLINIC | Age: 72
End: 2022-12-13
Payer: COMMERCIAL

## 2022-12-13 ENCOUNTER — HOSPITAL ENCOUNTER (OUTPATIENT)
Dept: GENERAL RADIOLOGY | Facility: HOSPITAL | Age: 72
Discharge: HOME | End: 2022-12-13
Attending: PHYSICIAN ASSISTANT | Admitting: PHYSICIAN ASSISTANT
Payer: COMMERCIAL

## 2022-12-13 VITALS
HEART RATE: 58 BPM | WEIGHT: 190.1 LBS | DIASTOLIC BLOOD PRESSURE: 78 MMHG | BODY MASS INDEX: 25.08 KG/M2 | TEMPERATURE: 97.8 F | SYSTOLIC BLOOD PRESSURE: 154 MMHG | OXYGEN SATURATION: 98 % | RESPIRATION RATE: 20 BRPM

## 2022-12-13 DIAGNOSIS — R05.1 ACUTE COUGH: ICD-10-CM

## 2022-12-13 DIAGNOSIS — J21.9 BRONCHIOLITIS: Primary | ICD-10-CM

## 2022-12-13 PROCEDURE — 71046 X-RAY EXAM CHEST 2 VIEWS: CPT

## 2022-12-13 PROCEDURE — 99214 OFFICE O/P EST MOD 30 MIN: CPT | Performed by: PHYSICIAN ASSISTANT

## 2022-12-13 RX ORDER — BENZONATATE 100 MG/1
100 CAPSULE ORAL 3 TIMES DAILY PRN
Qty: 21 CAPSULE | Refills: 0 | Status: SHIPPED | OUTPATIENT
Start: 2022-12-13 | End: 2023-08-17

## 2022-12-14 NOTE — PATIENT INSTRUCTIONS
There were no signs of pneumonia.    Your symptoms are most likely due to acute bronchitis.  This is inflammation of the tubes leading into the lungs, most often due to a viral infection and an antibiotic will not help this.    May take Tessalon Perles as needed for cough.  May also try to use Mucinex or Robitussin.    Please monitor symptoms carefully.  If developing chest pain, shortness of breath, fever, coughing up blood, extreme fatigue, or any other new, concerning symptoms, come back to clinic or go to ER immediately.  Otherwise, if no improvement in symptoms in one week, follow-up with your primary care provider.

## 2022-12-14 NOTE — PROGRESS NOTES
"Patient presents with:  Cough: X 1 week. Pt states \"some gurgling sound in chest\". No SOB. Congestion, no fever, chills yesterday but passed. No headaches or dizziness      Clinical Decision Making:  Patient experiencing cough for 1 week.  Patient reporting gurgling sensation in chest.  Lungs are CTA.  X-ray is negative for any pneumonia.  Suspect bronchitis.  Patient was instructed to take expectorant and patient was prescribed Tessalon Perles.    ICD-10-CM    1. Bronchiolitis  J21.9 benzonatate (TESSALON) 100 MG capsule      2. Acute cough  R05.1 XR Chest 2 Views          Patient Instructions   There were no signs of pneumonia.    Your symptoms are most likely due to acute bronchitis.  This is inflammation of the tubes leading into the lungs, most often due to a viral infection and an antibiotic will not help this.    May take Tessalon Perles as needed for cough.  May also try to use Mucinex or Robitussin.    Please monitor symptoms carefully.  If developing chest pain, shortness of breath, fever, coughing up blood, extreme fatigue, or any other new, concerning symptoms, come back to clinic or go to ER immediately.  Otherwise, if no improvement in symptoms in one week, follow-up with your primary care provider.        HPI:  Javad Nicholson II is a 72 year old male who presents today complaining of cough for the past 1 week.  Patient experiencing gurgling sound in the front of his chest.  No shortness of breath, fever, or headaches.  He did have some chills yesterday, but they have passed.  Last night patient did also have sweats admitted so he needed to change his clothes.  He has not had any really recent travel outside of the country except for to [place] in September of this year.  Patient had COVID just before he went to [place].    History obtained from the patient.    Problem List:  2022-02: [place] teeth extracted  2022-02: Hx of tonsillectomy  2022-02: History of colonic polyps  2022-02: Osteoarthritis of " right knee, unspecified osteoarthritis type  2022-02: Aortic root enlargement (H)  Impaired Fasting Glucose  Irritable Bowel Syndrome  Aneurysm Of The Aortic Arch  Sleep Apnea      Past Medical History:   Diagnosis Date     Aortic root enlargement (H)      History of colonic polyps      Hx of tonsillectomy      Osteoarthritis of right knee, unspecified osteoarthritis type        Social History     Tobacco Use     Smoking status: Never     Smokeless tobacco: Never   Substance Use Topics     Alcohol use: Yes       Review of Systems    Vitals:    12/13/22 1901   BP: (!) 154/78   BP Location: Left arm   Patient Position: Sitting   Cuff Size: Adult Regular   Pulse: 58   Resp: 20   Temp: 97.8  F (36.6  C)   TempSrc: Oral   SpO2: 98%   Weight: 86.2 kg (190 lb 1.6 oz)       Physical Exam  Vitals and nursing note reviewed.   Constitutional:       General: He is not in acute distress.     Appearance: He is not toxic-appearing or diaphoretic.   HENT:      Head: Normocephalic and atraumatic.      Right Ear: External ear normal.      Left Ear: External ear normal.   Eyes:      Conjunctiva/sclera: Conjunctivae normal.   Cardiovascular:      Rate and Rhythm: Normal rate and regular rhythm.      Heart sounds: No murmur heard.  Pulmonary:      Effort: Pulmonary effort is normal. No respiratory distress.      Breath sounds: No stridor. No wheezing, rhonchi or rales.   Neurological:      Mental Status: He is alert.   Psychiatric:         Mood and Affect: Mood normal.         Behavior: Behavior normal.         Thought Content: Thought content normal.         Judgment: Judgment normal.         Results:  I have personally ordered and preliminarily reviewed the following xray, I have noted no opacities or pleural effusions.   Results for orders placed or performed during the hospital encounter of 12/13/22   XR Chest 2 Views     Status: None    Narrative    EXAM: XR CHEST 2 VIEWS  LOCATION: [facility]  DATE/TIME:  12/13/2022 7:26 PM    INDICATION: Gurgling sensation in chest.  Cough for 1 week.  Lungs are sounding clear to auscultation  COMPARISON: CT chest 03/15/2016      Impression    IMPRESSION: Heart size and pulmonary vascularity normal. Pleural thickening/scarring right lung apex, unchanged. The lungs are otherwise clear.           At the end of the encounter, I discussed results, diagnosis, medications. Discussed red flags for immediate return to clinic/ER, as well as indications for follow up if no improvement. Patient understood and agreed to plan. Patient was stable for discharge.

## 2023-03-26 ENCOUNTER — HEALTH MAINTENANCE LETTER (OUTPATIENT)
Age: 73
End: 2023-03-26

## 2023-08-14 ASSESSMENT — ENCOUNTER SYMPTOMS
NERVOUS/ANXIOUS: 0
FEVER: 0
JOINT SWELLING: 1
DIARRHEA: 0
PALPITATIONS: 0
DYSURIA: 0
FREQUENCY: 1
HEMATURIA: 0
DIZZINESS: 0
HEMATOCHEZIA: 0
HEARTBURN: 0
COUGH: 0
EYE PAIN: 0
MYALGIAS: 1
SHORTNESS OF BREATH: 0
CHILLS: 0
ABDOMINAL PAIN: 0
PARESTHESIAS: 0
CONSTIPATION: 0
HEADACHES: 0
WEAKNESS: 0
ARTHRALGIAS: 1
SORE THROAT: 0
NAUSEA: 0

## 2023-08-14 ASSESSMENT — ACTIVITIES OF DAILY LIVING (ADL): CURRENT_FUNCTION: NO ASSISTANCE NEEDED

## 2023-08-17 ENCOUNTER — OFFICE VISIT (OUTPATIENT)
Dept: INTERNAL MEDICINE | Facility: CLINIC | Age: 73
End: 2023-08-17
Payer: COMMERCIAL

## 2023-08-17 VITALS
SYSTOLIC BLOOD PRESSURE: 132 MMHG | RESPIRATION RATE: 15 BRPM | DIASTOLIC BLOOD PRESSURE: 68 MMHG | TEMPERATURE: 97.3 F | HEIGHT: 73 IN | HEART RATE: 52 BPM | OXYGEN SATURATION: 96 % | BODY MASS INDEX: 24.39 KG/M2 | WEIGHT: 184 LBS

## 2023-08-17 DIAGNOSIS — Z23 NEED FOR PROPHYLACTIC VACCINATION WITH COMBINED DIPHTHERIA-TETANUS-PERTUSSIS (DTP) VACCINE: ICD-10-CM

## 2023-08-17 DIAGNOSIS — Z11.4 ENCOUNTER FOR SCREENING FOR HIV: ICD-10-CM

## 2023-08-17 DIAGNOSIS — H90.3 BILATERAL SENSORINEURAL HEARING LOSS: ICD-10-CM

## 2023-08-17 DIAGNOSIS — R07.9 CHEST PAIN, UNSPECIFIED TYPE: ICD-10-CM

## 2023-08-17 DIAGNOSIS — Z12.11 SCREEN FOR COLON CANCER: Primary | ICD-10-CM

## 2023-08-17 DIAGNOSIS — Z20.2 STD EXPOSURE: ICD-10-CM

## 2023-08-17 DIAGNOSIS — Z23 NEED FOR SHINGLES VACCINE: ICD-10-CM

## 2023-08-17 DIAGNOSIS — Z00.00 ENCOUNTER FOR GENERAL HEALTH EXAMINATION: ICD-10-CM

## 2023-08-17 DIAGNOSIS — Z23 NEED FOR DIPHTHERIA-TETANUS-PERTUSSIS (TDAP) VACCINE: ICD-10-CM

## 2023-08-17 DIAGNOSIS — Z23 ENCOUNTER FOR IMMUNIZATION: ICD-10-CM

## 2023-08-17 DIAGNOSIS — Z11.59 ENCOUNTER FOR HEPATITIS C SCREENING TEST FOR LOW RISK PATIENT: ICD-10-CM

## 2023-08-17 DIAGNOSIS — Z13.220 SCREENING FOR HYPERLIPIDEMIA: ICD-10-CM

## 2023-08-17 LAB
ALBUMIN SERPL BCG-MCNC: 4.4 G/DL (ref 3.5–5.2)
ALP SERPL-CCNC: 56 U/L (ref 40–129)
ALT SERPL W P-5'-P-CCNC: 22 U/L (ref 0–70)
ANION GAP SERPL CALCULATED.3IONS-SCNC: 9 MMOL/L (ref 7–15)
AST SERPL W P-5'-P-CCNC: 36 U/L (ref 0–45)
BILIRUB SERPL-MCNC: 0.8 MG/DL
BUN SERPL-MCNC: 26.6 MG/DL (ref 8–23)
CALCIUM SERPL-MCNC: 9.4 MG/DL (ref 8.8–10.2)
CHLORIDE SERPL-SCNC: 106 MMOL/L (ref 98–107)
CHOLEST SERPL-MCNC: 170 MG/DL
CREAT SERPL-MCNC: 1.14 MG/DL (ref 0.67–1.17)
DEPRECATED HCO3 PLAS-SCNC: 25 MMOL/L (ref 22–29)
ERYTHROCYTE [DISTWIDTH] IN BLOOD BY AUTOMATED COUNT: 12.5 % (ref 10–15)
GFR SERPL CREATININE-BSD FRML MDRD: 68 ML/MIN/1.73M2
GLUCOSE SERPL-MCNC: 129 MG/DL (ref 70–99)
HCT VFR BLD AUTO: 40.8 % (ref 40–53)
HCV AB SERPL QL IA: NONREACTIVE
HDLC SERPL-MCNC: 43 MG/DL
HGB BLD-MCNC: 13.4 G/DL (ref 13.3–17.7)
LDLC SERPL CALC-MCNC: 111 MG/DL
MCH RBC QN AUTO: 32.2 PG (ref 26.5–33)
MCHC RBC AUTO-ENTMCNC: 32.8 G/DL (ref 31.5–36.5)
MCV RBC AUTO: 98 FL (ref 78–100)
NONHDLC SERPL-MCNC: 127 MG/DL
PLATELET # BLD AUTO: 151 10E3/UL (ref 150–450)
POTASSIUM SERPL-SCNC: 5.8 MMOL/L (ref 3.4–5.3)
PROT SERPL-MCNC: 6.7 G/DL (ref 6.4–8.3)
RBC # BLD AUTO: 4.16 10E6/UL (ref 4.4–5.9)
SODIUM SERPL-SCNC: 140 MMOL/L (ref 136–145)
T PALLIDUM AB SER QL: NONREACTIVE
TRIGL SERPL-MCNC: 81 MG/DL
WBC # BLD AUTO: 5.9 10E3/UL (ref 4–11)

## 2023-08-17 PROCEDURE — 87389 HIV-1 AG W/HIV-1&-2 AB AG IA: CPT | Performed by: INTERNAL MEDICINE

## 2023-08-17 PROCEDURE — 36415 COLL VENOUS BLD VENIPUNCTURE: CPT | Performed by: INTERNAL MEDICINE

## 2023-08-17 PROCEDURE — 86780 TREPONEMA PALLIDUM: CPT | Performed by: INTERNAL MEDICINE

## 2023-08-17 PROCEDURE — 85027 COMPLETE CBC AUTOMATED: CPT | Performed by: INTERNAL MEDICINE

## 2023-08-17 PROCEDURE — 93005 ELECTROCARDIOGRAM TRACING: CPT | Performed by: INTERNAL MEDICINE

## 2023-08-17 PROCEDURE — 86803 HEPATITIS C AB TEST: CPT | Performed by: INTERNAL MEDICINE

## 2023-08-17 PROCEDURE — 93010 ELECTROCARDIOGRAM REPORT: CPT | Performed by: INTERNAL MEDICINE

## 2023-08-17 PROCEDURE — 80053 COMPREHEN METABOLIC PANEL: CPT | Performed by: INTERNAL MEDICINE

## 2023-08-17 PROCEDURE — 80061 LIPID PANEL: CPT | Performed by: INTERNAL MEDICINE

## 2023-08-17 PROCEDURE — G0439 PPPS, SUBSEQ VISIT: HCPCS | Performed by: INTERNAL MEDICINE

## 2023-08-17 PROCEDURE — 99214 OFFICE O/P EST MOD 30 MIN: CPT | Mod: 25 | Performed by: INTERNAL MEDICINE

## 2023-08-17 ASSESSMENT — PAIN SCALES - GENERAL: PAINLEVEL: NO PAIN (0)

## 2023-08-17 ASSESSMENT — ENCOUNTER SYMPTOMS
ABDOMINAL PAIN: 0
NAUSEA: 0
COUGH: 0
DIZZINESS: 0
FREQUENCY: 1
HEADACHES: 0
NERVOUS/ANXIOUS: 0
MYALGIAS: 1
WEAKNESS: 0
CONSTIPATION: 0
SORE THROAT: 0
PALPITATIONS: 0
HEMATOCHEZIA: 0
SHORTNESS OF BREATH: 0
PARESTHESIAS: 0
HEARTBURN: 0
ARTHRALGIAS: 1
EYE PAIN: 0
HEMATURIA: 0
FEVER: 0
DIARRHEA: 0
DYSURIA: 0
JOINT SWELLING: 1
CHILLS: 0

## 2023-08-17 ASSESSMENT — ACTIVITIES OF DAILY LIVING (ADL): CURRENT_FUNCTION: NO ASSISTANCE NEEDED

## 2023-08-17 NOTE — LETTER
August 20, 2023      Baltazar Nicholson II  [address] [facility] [address]        Dear ,    We are writing to inform you of your test results.    Your tests are all good.  The minor abnormalities of the RBC count and the potassium, urea nitrogen, and the glucose are not significant.      Resulted Orders   CBC with platelets   Result Value Ref Range    WBC Count 5.9 4.0 - 11.0 10e3/uL    RBC Count 4.16 (L) 4.40 - 5.90 10e6/uL    Hemoglobin 13.4 13.3 - 17.7 g/dL    Hematocrit 40.8 40.0 - 53.0 %    MCV 98 78 - 100 fL    MCH 32.2 26.5 - 33.0 pg    MCHC 32.8 31.5 - 36.5 g/dL    RDW 12.5 10.0 - 15.0 %    Platelet Count 151 150 - 450 10e3/uL   Comprehensive metabolic panel   Result Value Ref Range    Sodium 140 136 - 145 mmol/L    Potassium 5.8 (H) 3.4 - 5.3 mmol/L    Chloride 106 98 - 107 mmol/L    Carbon Dioxide (CO2) 25 22 - 29 mmol/L    Anion Gap 9 7 - 15 mmol/L    Urea Nitrogen 26.6 (H) 8.0 - 23.0 mg/dL    Creatinine 1.14 0.67 - 1.17 mg/dL    Calcium 9.4 8.8 - 10.2 mg/dL    Glucose 129 (H) 70 - 99 mg/dL    Alkaline Phosphatase 56 40 - 129 U/L    AST 36 0 - 45 U/L      Comment:      Reference intervals for this test were updated on 6/12/2023 to more accurately reflect our healthy population. There may be differences in the flagging of prior results with similar values performed with this method. Interpretation of those prior results can be made in the context of the updated reference intervals.    ALT 22 0 - 70 U/L      Comment:      Reference intervals for this test were updated on 6/12/2023 to more accurately reflect our healthy population. There may be differences in the flagging of prior results with similar values performed with this method. Interpretation of those prior results can be made in the context of the updated reference intervals.      Protein Total 6.7 6.4 - 8.3 g/dL    Albumin 4.4 3.5 - 5.2 g/dL    Bilirubin Total 0.8 <=1.2 mg/dL    GFR Estimate 68 >60 mL/min/1.73m2   Lipid Profile (Chol,  Trig, HDL, LDL calc)   Result Value Ref Range    Cholesterol 170 <200 mg/dL    Triglycerides 81 <150 mg/dL    Direct Measure HDL 43 >=40 mg/dL    LDL Cholesterol Calculated 111 (H) <=100 mg/dL    Non HDL Cholesterol 127 <130 mg/dL    Narrative    Cholesterol  Desirable:  <200 mg/dL    Triglycerides  Normal:  Less than 150 mg/dL  Borderline High:  150-199 mg/dL  High:  200-499 mg/dL  Very High:  Greater than or equal to 500 mg/dL    Direct Measure HDL  Female:  Greater than or equal to 50 mg/dL   Male:  Greater than or equal to 40 mg/dL    LDL Cholesterol  Desirable:  <100mg/dL  Above Desirable:  100-129 mg/dL   Borderline High:  130-159 mg/dL   High:  160-189 mg/dL   Very High:  >= 190 mg/dL    Non HDL Cholesterol  Desirable:  130 mg/dL  Above Desirable:  130-159 mg/dL  Borderline High:  160-189 mg/dL  High:  190-219 mg/dL  Very High:  Greater than or equal to 220 mg/dL   HIV Antigen Antibody Combo   Result Value Ref Range    HIV Antigen Antibody Combo Nonreactive Nonreactive      Comment:      HIV-1 p24 Ag & HIV-1/HIV-2 Ab Not Detected   Hepatitis C antibody   Result Value Ref Range    Hepatitis C Antibody Nonreactive Nonreactive    Narrative    Assay performance characteristics have not been established for newborns, infants, and children.   Treponema Abs w Reflex to RPR and Titer   Result Value Ref Range    Treponema Antibody Total Nonreactive Nonreactive       If you have any questions or concerns, please call the clinic at the number listed above.       Sincerely,      Jed Hilario MD

## 2023-08-17 NOTE — PROGRESS NOTES
"SUBJECTIVE:   Baltazar is a 73 year old who presents for Preventive Visit.    HPI:  tends to mild diarrhea.  Mild nocturia.  Continues to work, and does cycling.  He tolerates cycling well.  Has noted a left chest pressure discomfort off and on, transient, and not clearly related to exertion.  Not pleuritic, no significant RODRIGUEZ, and no radiation.  Has noted this the past year.  No palpitations.  No bowel or bladder issues.  No unusual cough.  He would like to have audiogram - wife reports that he has hearing loss. He does note a sense of stiffness and arthralgia after periods of inactivity, no walker joint swelling.         8/17/2023     7:39 AM   Additional Questions   Roomed by gian orozco       Are you in the first 12 months of your Medicare coverage?  No    Healthy Habits:     In general, how would you rate your overall health?  Good    Frequency of exercise:  2-3 days/week    Duration of exercise:  45-60 minutes    Do you usually eat at least 4 servings of fruit and vegetables a day, include whole grains    & fiber and avoid regularly eating high fat or \"junk\" foods?  Yes    Taking medications regularly:  Yes    Medication side effects:  Muscle aches    Ability to successfully perform activities of daily living:  No assistance needed    Home Safety:  No safety concerns identified    Hearing Impairment:  No hearing concerns    In the past 6 months, have you been bothered by leaking of urine?  No    In general, how would you rate your overall mental or emotional health?  Good    Additional concerns today:  Yes    Have you ever done Advance Care Planning? (For example, a Health Directive, POLST, or a discussion with a medical provider or your loved ones about your wishes): No, advance care planning information given to patient to review.  Advanced care planning was discussed at today's visit.    Hearing - oK   Fall risk  Fallen 2 or more times in the past year?: No  Any fall with injury in the past year?: No    Cognitive " Screening: intact today.      Mini-CogTM Copyright VIN Schaffer. Licensed by the author for use in [facility]; reprinted with permission (sodavid@.[email or web address]). All rights reserved.      Do you have sleep apnea, excessive snoring or daytime drowsiness? : no    Reviewed and updated as needed this visit by clinical staff  Tobacco  Allergies  Meds            Reviewed and updated as needed this visit by Provider     Social History     Tobacco Use    Smoking status: Never    Smokeless tobacco: Never   Substance Use Topics    Alcohol use: Yes         8/14/2023     7:31 PM   Alcohol Use   Prescreen: >3 drinks/day or >7 drinks/week? No     Do you have a current opioid prescription? No  Do you use any other controlled substances or medications that are not prescribed by a provider? None    Current providers sharing in care for this patient include:   Patient Care Team:    Jed Hilario MD as Referring Physician (Internal Medicine)    The following health maintenance items are reviewed in Epic and correct as of today:  Health Maintenance   Topic Date Due    COLORECTAL CANCER SCREENING  Never done    DTAP/TDAP/TD IMMUNIZATION (1 - Tdap) Never done    ZOSTER IMMUNIZATION (1 of 2) Never done    Pneumococcal Vaccine: 65+ Years (1 - PCV) Never done    MEDICARE ANNUAL WELLNESS VISIT  02/23/2023    COVID-19 Vaccine (6 - Pfizer series) 06/04/2023    ANNUAL REVIEW OF HM ORDERS  09/08/2023    INFLUENZA VACCINE (1) 09/01/2023    FALL RISK ASSESSMENT  08/17/2024    LIPID  02/23/2027    ADVANCE CARE PLANNING  02/23/2027    HEPATITIS C SCREENING  Completed    PHQ-2 (once per calendar year)  Completed    AORTIC ANEURYSM SCREENING (SYSTEM ASSIGNED)  Completed    IPV IMMUNIZATION  Aged Out    MENINGITIS IMMUNIZATION  Aged Out     Review of Systems   Constitutional:  Negative for chills and fever.   HENT:  Negative for congestion, ear pain, hearing loss and sore throat.    Eyes:  Negative for pain and visual disturbance.   Respiratory:   "Negative for cough and shortness of breath.    Cardiovascular:  Positive for chest pain. Negative for palpitations and peripheral edema.   Gastrointestinal:  Negative for abdominal pain, constipation, diarrhea, heartburn, hematochezia and nausea.   Genitourinary:  Positive for frequency. Negative for dysuria, genital sores, hematuria, impotence, penile discharge and urgency.   Musculoskeletal:  Positive for arthralgias, joint swelling and myalgias.   Skin:  Positive for rash.   Neurological:  Negative for dizziness, weakness, headaches and paresthesias.   Psychiatric/Behavioral:  Negative for mood changes. The patient is not nervous/anxious.      OBJECTIVE:     PHYSICAL EXAM:  General Appearance: In no acute distress  /68 (BP Location: Left arm, Patient Position: Sitting, Cuff Size: Adult Large)   Pulse 52   Temp 97.3  F (36.3  C)   Resp 15   Ht 1.854 m (6' 1\")   Wt 83.5 kg (184 lb)   SpO2 96%   BMI 24.28 kg/m    EYES: Clear, fundi are unremarkable   HEENT: nose and throat clear, ears normal   NECK:  without cervical or axillary adenopathy  RESPIRATORY: Clear   CARDIOVASCULAR: S1, S2  ABDOMEN: soft, flat, and non-tender  EXTREMITIES: No joint swelling, no ulcer or edema  NEUROLOGIC: Speech is clear, gait is normal  PSYCHIATRIC: Oriented X 3, behavior and affect normal, thinking is clear     ASSESSMENT / PLAN:     Javad was seen today for recheck medication and physical.    Diagnoses and all orders for this visit:    Screen for colon cancer  He reports negative colonoscopy and won't be due for a few years.     Need for shingles vaccine  He received the shingrix vaccination    Need for diphtheria-tetanus-pertussis (Tdap) vaccine  Referred to pharmacy    Bilateral sensorineural hearing loss  -     Adult Audiology  Referral; Future    Chest pain, unspecified type  Symptoms are atypical.  Given age, and his cycling, he agrees to stress test.  We discussed that he should avoid extreme exertion (he " normally does) be moderate in activity pending the stress test.  His EKG today has non-specific ST and T changes otherwise negative.   -     EKG 12-lead, tracing only  -     Echocardiogram Exercise Stress; Future    Encounter for general health examination  -     CBC with platelets  -     Comprehensive metabolic panel    Screening for hyperlipidemia   Lipid Profile (Chol, Trig, HDL, LDL calc)    Encounter for screening for HIV  -     HIV Antigen Antibody Combo    Encounter for hepatitis C screening test for low risk patient  -     Hepatitis C antibody    Treponemal exposure - given his occupation  -     Treponema Abs w Reflex to RPR and Titer    COUNSELING:  Reviewed preventive health counseling, as reflected in patient instructions       Regular exercise       Healthy diet/nutrition       Colon cancer screening       Prostate cancer screening    He reports that he has never smoked. He has never used smokeless tobacco.  Appropriate preventive services were discussed with this patient, including applicable screening as appropriate for cardiovascular disease, diabetes, osteopenia/osteoporosis, and glaucoma.  As appropriate for age/gender, discussed screening for colorectal cancer, prostate cancer, breast cancer, and cervical cancer. Checklist reviewing preventive services available has been given to the patient.    Reviewed patients plan of care and provided an AVS. The Basic Care Plan (routine screening as documented in Health Maintenance) for Javad meets the Care Plan requirement. This Care Plan has been established and reviewed with the Patient.    Jed Hilario MD  [facility]    Identified Health Risks:  I have reviewed Opioid Use Disorder and Substance Use Disorder risk factors and made any needed referrals.   Chest pain

## 2023-08-18 ENCOUNTER — TELEPHONE (OUTPATIENT)
Dept: INTERNAL MEDICINE | Facility: CLINIC | Age: 73
End: 2023-08-18
Payer: COMMERCIAL

## 2023-08-18 DIAGNOSIS — E87.5 HYPERKALEMIA: Primary | ICD-10-CM

## 2023-08-18 LAB
ATRIAL RATE - MUSE: 49 BPM
DIASTOLIC BLOOD PRESSURE - MUSE: NORMAL MMHG
HIV 1+2 AB+HIV1 P24 AG SERPL QL IA: NONREACTIVE
INTERPRETATION ECG - MUSE: NORMAL
P AXIS - MUSE: 73 DEGREES
PR INTERVAL - MUSE: 126 MS
QRS DURATION - MUSE: 86 MS
QT - MUSE: 504 MS
QTC - MUSE: 455 MS
R AXIS - MUSE: 48 DEGREES
SYSTOLIC BLOOD PRESSURE - MUSE: NORMAL MMHG
T AXIS - MUSE: 57 DEGREES
VENTRICULAR RATE- MUSE: 49 BPM

## 2023-08-18 NOTE — TELEPHONE ENCOUNTER
Called pt in an attempt to relay provider message x 1. Could not reach pt or leave message as mailbox is full.      Zhen Vizcaino, BSN RN  [facility]

## 2023-08-18 NOTE — TELEPHONE ENCOUNTER
Please let patient know,  His lab work shows potassium was moderately elevated at 5.8.  Function is normal.    He is not on any medications that would increase his potassium level.    Currently I would recommend decreasing high potassium foods in diet.  (Bananas, oranges, potatoes tomatoes avocados), making sure he is staying hydrated and repeating potassium level again next week.    Make sure he is not taking some kind of vitamin that has extra potassium in it or over-the-counter potassium supplement.    If he starts experiencing muscle twitching or palpitations over the weekend he can come to the emergency room.  Dr KWOK

## 2024-01-04 ENCOUNTER — PATIENT OUTREACH (OUTPATIENT)
Dept: GASTROENTEROLOGY | Facility: CLINIC | Age: 74
End: 2024-01-04
Payer: COMMERCIAL

## 2024-03-07 NOTE — PROGRESS NOTES
Updated high risk CRC screening episode as pt is graduated. Maral Castillo RN on 3/7/2024 at 10:59 AM

## 2024-10-13 ENCOUNTER — HEALTH MAINTENANCE LETTER (OUTPATIENT)
Age: 74
End: 2024-10-13

## 2025-02-03 ENCOUNTER — OFFICE VISIT (OUTPATIENT)
Dept: INTERNAL MEDICINE | Facility: CLINIC | Age: 75
End: 2025-02-03
Payer: COMMERCIAL

## 2025-02-03 VITALS
RESPIRATION RATE: 14 BRPM | WEIGHT: 183.8 LBS | TEMPERATURE: 96.5 F | HEIGHT: 73 IN | DIASTOLIC BLOOD PRESSURE: 67 MMHG | HEART RATE: 53 BPM | OXYGEN SATURATION: 99 % | BODY MASS INDEX: 24.36 KG/M2 | SYSTOLIC BLOOD PRESSURE: 136 MMHG

## 2025-02-03 DIAGNOSIS — E78.5 HYPERLIPIDEMIA LDL GOAL <100: ICD-10-CM

## 2025-02-03 DIAGNOSIS — Z00.00 ENCOUNTER FOR ANNUAL WELLNESS EXAM IN MEDICARE PATIENT: Primary | ICD-10-CM

## 2025-02-03 DIAGNOSIS — Z11.59 ENCOUNTER FOR SCREENING FOR OTHER VIRAL DISEASES: ICD-10-CM

## 2025-02-03 DIAGNOSIS — Z12.5 SCREENING FOR PROSTATE CANCER: ICD-10-CM

## 2025-02-03 DIAGNOSIS — Z71.84 TRAVEL ADVICE ENCOUNTER: ICD-10-CM

## 2025-02-03 DIAGNOSIS — I71.20 THORACIC AORTIC ANEURYSM WITHOUT RUPTURE, UNSPECIFIED PART: ICD-10-CM

## 2025-02-03 DIAGNOSIS — H91.90 CHANGE IN HEARING, UNSPECIFIED LATERALITY: ICD-10-CM

## 2025-02-03 DIAGNOSIS — I10 PRIMARY HYPERTENSION: ICD-10-CM

## 2025-02-03 DIAGNOSIS — R73.09 ABNORMAL GLUCOSE: ICD-10-CM

## 2025-02-03 LAB
ALBUMIN SERPL BCG-MCNC: 4.2 G/DL (ref 3.5–5.2)
ALP SERPL-CCNC: 63 U/L (ref 40–150)
ALT SERPL W P-5'-P-CCNC: 20 U/L (ref 0–70)
ANION GAP SERPL CALCULATED.3IONS-SCNC: 9 MMOL/L (ref 7–15)
AST SERPL W P-5'-P-CCNC: 32 U/L (ref 0–45)
BASOPHILS # BLD AUTO: 0 10E3/UL (ref 0–0.2)
BASOPHILS NFR BLD AUTO: 1 %
BILIRUB SERPL-MCNC: 0.7 MG/DL
BUN SERPL-MCNC: 23.2 MG/DL (ref 8–23)
CALCIUM SERPL-MCNC: 9.5 MG/DL (ref 8.8–10.4)
CHLORIDE SERPL-SCNC: 103 MMOL/L (ref 98–107)
CHOLEST SERPL-MCNC: 190 MG/DL
CREAT SERPL-MCNC: 1.01 MG/DL (ref 0.67–1.17)
EGFRCR SERPLBLD CKD-EPI 2021: 79 ML/MIN/1.73M2
EOSINOPHIL # BLD AUTO: 0.5 10E3/UL (ref 0–0.7)
EOSINOPHIL NFR BLD AUTO: 7 %
ERYTHROCYTE [DISTWIDTH] IN BLOOD BY AUTOMATED COUNT: 12.3 % (ref 10–15)
EST. AVERAGE GLUCOSE BLD GHB EST-MCNC: 128 MG/DL
FASTING STATUS PATIENT QL REPORTED: ABNORMAL
FASTING STATUS PATIENT QL REPORTED: ABNORMAL
GLUCOSE SERPL-MCNC: 105 MG/DL (ref 70–99)
HAV AB SER QL IA: REACTIVE
HBA1C MFR BLD: 6.1 % (ref 0–5.6)
HBV SURFACE AB SERPL IA-ACNC: >1000 M[IU]/ML
HBV SURFACE AB SERPL IA-ACNC: REACTIVE M[IU]/ML
HCO3 SERPL-SCNC: 26 MMOL/L (ref 22–29)
HCT VFR BLD AUTO: 39.7 % (ref 40–53)
HDLC SERPL-MCNC: 49 MG/DL
HGB BLD-MCNC: 13.6 G/DL (ref 13.3–17.7)
IMM GRANULOCYTES # BLD: 0 10E3/UL
IMM GRANULOCYTES NFR BLD: 0 %
LDLC SERPL CALC-MCNC: 125 MG/DL
LYMPHOCYTES # BLD AUTO: 2.1 10E3/UL (ref 0.8–5.3)
LYMPHOCYTES NFR BLD AUTO: 27 %
MCH RBC QN AUTO: 32.5 PG (ref 26.5–33)
MCHC RBC AUTO-ENTMCNC: 34.3 G/DL (ref 31.5–36.5)
MCV RBC AUTO: 95 FL (ref 78–100)
MONOCYTES # BLD AUTO: 0.6 10E3/UL (ref 0–1.3)
MONOCYTES NFR BLD AUTO: 7 %
NEUTROPHILS # BLD AUTO: 4.5 10E3/UL (ref 1.6–8.3)
NEUTROPHILS NFR BLD AUTO: 58 %
NONHDLC SERPL-MCNC: 141 MG/DL
PLATELET # BLD AUTO: 178 10E3/UL (ref 150–450)
POTASSIUM SERPL-SCNC: 4.4 MMOL/L (ref 3.4–5.3)
PROT SERPL-MCNC: 6.9 G/DL (ref 6.4–8.3)
PSA SERPL DL<=0.01 NG/ML-MCNC: 1.16 NG/ML (ref 0–6.5)
RBC # BLD AUTO: 4.19 10E6/UL (ref 4.4–5.9)
SODIUM SERPL-SCNC: 138 MMOL/L (ref 135–145)
TRIGL SERPL-MCNC: 81 MG/DL
TSH SERPL DL<=0.005 MIU/L-ACNC: 2.48 UIU/ML (ref 0.3–4.2)
WBC # BLD AUTO: 7.7 10E3/UL (ref 4–11)

## 2025-02-03 PROCEDURE — 86708 HEPATITIS A ANTIBODY: CPT | Performed by: INTERNAL MEDICINE

## 2025-02-03 PROCEDURE — 91320 SARSCV2 VAC 30MCG TRS-SUC IM: CPT | Performed by: INTERNAL MEDICINE

## 2025-02-03 PROCEDURE — 99214 OFFICE O/P EST MOD 30 MIN: CPT | Mod: 25 | Performed by: INTERNAL MEDICINE

## 2025-02-03 PROCEDURE — 80053 COMPREHEN METABOLIC PANEL: CPT | Performed by: INTERNAL MEDICINE

## 2025-02-03 PROCEDURE — 85025 COMPLETE CBC W/AUTO DIFF WBC: CPT | Performed by: INTERNAL MEDICINE

## 2025-02-03 PROCEDURE — 84443 ASSAY THYROID STIM HORMONE: CPT | Performed by: INTERNAL MEDICINE

## 2025-02-03 PROCEDURE — 36415 COLL VENOUS BLD VENIPUNCTURE: CPT | Performed by: INTERNAL MEDICINE

## 2025-02-03 PROCEDURE — 80061 LIPID PANEL: CPT | Performed by: INTERNAL MEDICINE

## 2025-02-03 PROCEDURE — G0439 PPPS, SUBSEQ VISIT: HCPCS | Performed by: INTERNAL MEDICINE

## 2025-02-03 PROCEDURE — 86765 RUBEOLA ANTIBODY: CPT | Performed by: INTERNAL MEDICINE

## 2025-02-03 PROCEDURE — G0103 PSA SCREENING: HCPCS | Performed by: INTERNAL MEDICINE

## 2025-02-03 PROCEDURE — 86706 HEP B SURFACE ANTIBODY: CPT | Performed by: INTERNAL MEDICINE

## 2025-02-03 PROCEDURE — 90480 ADMN SARSCOV2 VAC 1/ONLY CMP: CPT | Performed by: INTERNAL MEDICINE

## 2025-02-03 PROCEDURE — 86762 RUBELLA ANTIBODY: CPT | Performed by: INTERNAL MEDICINE

## 2025-02-03 PROCEDURE — 83036 HEMOGLOBIN GLYCOSYLATED A1C: CPT | Performed by: INTERNAL MEDICINE

## 2025-02-03 RX ORDER — ATOVAQUONE AND PROGUANIL HYDROCHLORIDE 250; 100 MG/1; MG/1
1 TABLET, FILM COATED ORAL DAILY
Qty: 23 TABLET | Refills: 0 | Status: SHIPPED | OUTPATIENT
Start: 2025-02-03

## 2025-02-03 SDOH — HEALTH STABILITY: PHYSICAL HEALTH: ON AVERAGE, HOW MANY DAYS PER WEEK DO YOU ENGAGE IN MODERATE TO STRENUOUS EXERCISE (LIKE A BRISK WALK)?: 7 DAYS

## 2025-02-03 ASSESSMENT — PAIN SCALES - GENERAL: PAINLEVEL_OUTOF10: NO PAIN (0)

## 2025-02-03 ASSESSMENT — SOCIAL DETERMINANTS OF HEALTH (SDOH): HOW OFTEN DO YOU GET TOGETHER WITH FRIENDS OR RELATIVES?: TWICE A WEEK

## 2025-02-03 NOTE — PROGRESS NOTES
Preventive Care Visit  [facility]  Emma Rose MD, Internal Medicine  Feb 3, 2025      Assessment & Plan     Annual wellness exam.  Will do fasting labs, discussed vaccinations.  Will obtain his colonoscopy from [facility] he reports having a beta-hCG of 67 with his 10-year follow-up.  PSA was ordered today.    Primary hypertension  He is on a small dose of atenolol, heart rate is 53, blood pressure today slightly above goal, discussed to monitor at home and let us know if it is above 130/80, would recommend tight blood pressure control in light of aortic aneurysm.  - Lipid panel reflex to direct LDL Fasting  - Comprehensive metabolic panel  - TSH with free T4 reflex    Thoracic aortic aneurysm without rupture, unspecified part  Followed by cardiology at [facility], on last echocardiogram his aortic root was 4.5 cm.  No history of bicuspid valve.  Previously he was referred for genetic testing for Marfan syndrome but it was postponed.  - Lipid panel reflex to direct LDL Fasting  - Comprehensive metabolic panel  - CBC with platelets and differential    Screening for prostate cancer  - PSA, screen    Hyperlipidemia LDL goal <100  - Lipid panel reflex to direct LDL Fasting  - Comprehensive metabolic panel  - TSH with free T4 reflex    Abnormal glucose  A1c 2 years ago was 6.1.  - Hemoglobin A1c    Change in hearing, unspecified laterality  - Adult Audiology  Referral; Future    Travel advice encounter  He is traveling to [place], will give him COVID shot today, discussed to get tetanus and RSV vaccine at the pharmacy.  Will check for immunity for measles, hep A and B.  He will come back for a typhoid vaccination.  Discussed to call health department to inquire about she can continue and encephalitis vaccines.  Malaria prophylaxis was provided.  - atovaquone-proguanil (MALARONE) 250-100 MG tablet; Take 1 tablet by mouth daily. Start 2 days before travel and continue 7 days  after return.  - Hepatitis A Antibody Total  - Hepatitis B Surface Antibody  - Rubeola Antibody IgG  - Rubella Antibody IgG    Encounter for screening for other viral diseases  - Hepatitis B Surface Antibody    Counseling  Appropriate preventive services were addressed with this patient via screening, questionnaire, or discussion as appropriate for fall prevention, nutrition, physical activity, Tobacco-use cessation, social engagement, weight loss and cognition.  Checklist reviewing preventive services available has been given to the patient.  Reviewed patient's diet, addressing concerns and/or questions.   The patient was provided with written information regarding signs of hearing loss.           Flora Ledesma is a 74 year old, presenting for the following:  Physical (Patient reports they are here for annual exam. Still needs to get hearing test done. A1C & PSA  - wants to get checked. 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    No concerns or complaints today.    She does see cardiology at [facility] regularly to monitor his aortic root dilatation, he has appointment with them scheduled later this week.  Blood pressure today is slightly above goal.  He is on atenolol.  He has not been monitoring it at home this closely.    He is monitoring really active and does a lot of walking skating and skiing several days a week.  Denies any chest pains palpitations or shortness of breath.  Of last colonoscopy was at the age of 68 with [facility] and 10-year follow-up was recommended, I do not have records.    He drinks 1-2 alcoholic beverages 3 times a  week.    He sees ophthalmologist regularly.  He would like to have screening for hearing loss done.  No recent falls.    Occasionally he does have anxiety and problems with sleep.    Him and his wife are planning to travel to [place] in March.  He has gotten flu shot but has not gotten COVID booster yet.    Health Care Directive  Patient does not have a Health Care Directive: Discussed advance care planning with patient; information given to patient to review.      2/3/2025   General Health   How would you rate your overall physical health? Excellent   Feel stress (tense, anxious, or unable to sleep) Only a little   (!) STRESS CONCERN      2/3/2025   Nutrition   Diet: Regular (no restrictions)         2/3/2025   Exercise   Days per week of moderate/strenous exercise 7 days         2/3/2025   Social Factors   Frequency of gathering with friends or relatives Twice a week   Worry food won't last until get money to buy more No   Food not last or not have enough money for food? No   Do you have housing? (Housing is defined as stable permanent housing and does not include staying ouside in a car, in a tent, in an abandoned building, in an overnight shelter, or couch-surfing.) Yes   Are you worried about losing your housing? No   Lack of transportation? No   Unable to get utilities (heat,electricity)? No         2/3/2025   Fall Risk   Fallen 2 or more times in the past year? No   Trouble with walking or balance? No          2/3/2025   Activities of Daily Living- Home Safety   Needs help with the following daily activites None of the above   Safety concerns in the home None of the above         2/3/2025   Dental   Dentist two times every year? Yes         2/3/2025   Hearing Screening   Hearing concerns? (!) IT'S HARD TO FOLLOW A CONVERSATION IN A NOISY RESTAURANT OR CROWDED ROOM.    (!) TROUBLE UNDERSTANDING SOFT OR WHISPERED SPEECH.       Multiple values from one day are sorted in reverse-chronological order          2/3/2025   Driving Risk Screening   Patient/family members have concerns about driving No         2/3/2025   General Alertness/Fatigue Screening   Have you been more tired than usual lately? No         2/3/2025   Urinary Incontinence Screening   Bothered by leaking urine in past 6 months No         2/3/2025   TB Screening   Were you born outside of the [place]? No         Today's PHQ-2 Score:       2/3/2025     1:32 PM   PHQ-2 ( 1999 Pfizer)   Q1: Little interest or pleasure in doing things 0   Q2: Feeling down, depressed or hopeless 0   PHQ-2 Score 0    Q1: Little interest or pleasure in doing things Not at all   Q2: Feeling down, depressed or hopeless Not at all   PHQ-2 Score 0       Patient-reported           2/3/2025   Substance Use   Alcohol more than 3/day or more than 7/wk No   Do you have a current opioid prescription? No   How severe/bad is pain from 1 to 10? 0/10 (No Pain)   Do you use any other substances recreationally? No     Social History     Tobacco Use    Smoking status: Never    Smokeless tobacco: Never   Vaping Use    Vaping status: Never Used   Substance Use Topics    Alcohol use: Yes    Drug use: Never           2/3/2025   AAA Screening   Family history of Abdominal Aortic Aneurysm (AAA)? No   ASCVD Risk   The 10-year ASCVD risk score (Cameron DK, et al., 2019) is: 23.9%    Values used to calculate the score:      Age: 74 years      Sex: Male      Is Non- : No      Diabetic: No      Tobacco smoker: No      Systolic Blood Pressure: 136 mmHg      Is BP treated: No      HDL Cholesterol: 43 mg/dL      Total Cholesterol: 170 mg/dL    Reviewed and updated as needed this visit by Provider                  Current providers sharing in care for this patient include:  Patient Care Team:  No Ref-Primary, Physician as PCP - General  Jed Hilario MD as Referring Physician (Internal Medicine)  Jed Hilario MD as Assigned PCP    The following health maintenance items are  "reviewed in Epic and correct as of today:  Health Maintenance   Topic Date Due    DTAP/TDAP/TD IMMUNIZATION (1 - Tdap) Never done    Pneumococcal Vaccine: 50+ Years (1 of 1 - PCV) Never done    ZOSTER IMMUNIZATION (1 of 2) Never done    ANNUAL REVIEW OF HM ORDERS  09/08/2023    MEDICARE ANNUAL WELLNESS VISIT  08/17/2024    COVID-19 Vaccine (7 - 2024-25 season) 09/01/2024    FALL RISK ASSESSMENT  02/03/2026    RSV VACCINE (1 - 1-dose 75+ series) 02/16/2026    GLUCOSE  08/17/2026    COLORECTAL CANCER SCREENING  02/16/2028    LIPID  08/17/2028    ADVANCE CARE PLANNING  08/17/2028    HEPATITIS C SCREENING  Completed    PHQ-2 (once per calendar year)  Completed    INFLUENZA VACCINE  Completed    HPV IMMUNIZATION  Aged Out    MENINGITIS IMMUNIZATION  Aged Out    RSV MONOCLONAL ANTIBODY  Aged Out     Review of systems: As above, no abdominal pain blood in the stool or urinary problems     Objective    Exam  /67 (BP Location: Left arm, Patient Position: Sitting, Cuff Size: Adult Regular)   Pulse 53   Temp (!) 96.5  F (35.8  C) (Temporal)   Resp 14   Ht 1.842 m (6' 0.5\")   Wt 83.4 kg (183 lb 12.8 oz)   SpO2 99%   BMI 24.59 kg/m     Estimated body mass index is 24.59 kg/m  as calculated from the following:    Height as of this encounter: 1.842 m (6' 0.5\").    Weight as of this encounter: 83.4 kg (183 lb 12.8 oz).    Physical Exam  General: well appearing male, alert and oriented x3  EYES: Eyelids, conjunctiva, and sclera were normal. Pupils were normal.   HEAD, EARS, NOSE, MOUTH, AND THROAT: no cervical LAD, no thyromegaly or nodules appreciated. TMs are visualized and normal, oropharynx is clear.  RESPIRATORY: respirations non labored, CTA bl, no wheezes, rales, no forced expiratory wheezing.  CARDIOVASCULAR: Heart rate and rhythm were normal. No murmurs, rubs,gallops. There was no peripheral edema. No carotid bruits.  GASTROINTESTINAL: Positive bowel sounds, abdomen is soft, non tender, non distended.   "   MUSCULOSKELETAL: Muscle mass was normal for age. No joint synovitis or deformity.  LYMPHATIC: There were no enlarged nodes palpable.  SKIN/HAIR/NAILS: Skin color was normal.  No rashes.  Mild to moderate varicosities in his ankles noted, slight lower extremity edema bilaterally.  NEUROLOGIC: The patient was alert and oriented.  Speech was normal.  There is no facial asymmetry.   PSYCHIATRIC:  Mood and affect were normal.            2/3/2025   Mini Cog   Clock Draw Score 2 Normal   3 Item Recall 3 objects recalled   Mini Cog Total Score 5              Signed Electronically by: Emma Rose MD

## 2025-02-03 NOTE — PATIENT INSTRUCTIONS
Monitor b/p at home, goal b/p <130/80, if it is above that, we may add something to metoprolol.    2. Covid booster today    3. Get RSV and pneumococcal 20 vaccine at the pharmacy.    4. Will get records from [facility] on recent colonoscopy results.    5. Avoid alcohol due to b/p effects and sleep effects. Yogi tea for stress, magnesium glycine supplements.    6. Hearing screen: audiology    7. For travel: Will check immunity to hepatitis, measles to see if you need a booster.    Covid vaccine today.  Schedule Typhoid vaccine at our office.    Get Tetanus shot ( tdap): at the pharmacy.  Malarone for Malaria prophylactics.  Call health department: [place], encephalitis- vaccines..

## 2025-02-03 NOTE — LETTER
February 4, 2025      Baltazar Nicholson II  [address] [facility] [address]        Dear ,    We are writing to inform you of your test results.    Baltazar,  LDL cholesterol is moderately elevated. Due to your age and history of elevated b/p your calculated cardiac risk is high. I would recommend starting on cholesterol lowering medication: Crestor or doing cardiac CT to evaluate for plaque to further help stratify cardiac risk/need for medication. Let me know what you decide.    Sugars are pre-diabetic.  Kidney,liver, thyroid functions and red cell counts are normal.  PSA is in the normal range.  You do have immunity of measles, hep A and Hep B.    DR SUNDAR Hui Orders   Lipid panel reflex to direct LDL Fasting   Result Value Ref Range    Cholesterol 190 <200 mg/dL    Triglycerides 81 <150 mg/dL    Direct Measure HDL 49 >=40 mg/dL    LDL Cholesterol Calculated 125 (H) <100 mg/dL    Non HDL Cholesterol 141 (H) <130 mg/dL    Patient Fasting > 8hrs? Unknown     Narrative    Cholesterol  Desirable: < 200 mg/dL  Borderline High: 200 - 239 mg/dL  High: >= 240 mg/dL    Triglycerides  Normal: < 150 mg/dL  Borderline High: 150 - 199 mg/dL  High: 200-499 mg/dL  Very High: >= 500 mg/dL    Direct Measure HDL  Female: >= 50 mg/dL   Male: >= 40 mg/dL    LDL Cholesterol  Desirable: < 100 mg/dL  Above Desirable: 100 - 129 mg/dL   Borderline High: 130 - 159 mg/dL   High:  160 - 189 mg/dL   Very High: >= 190 mg/dL    Non HDL Cholesterol  Desirable: < 130 mg/dL  Above Desirable: 130 - 159 mg/dL  Borderline High: 160 - 189 mg/dL  High: 190 - 219 mg/dL  Very High: >= 220 mg/dL   Comprehensive metabolic panel   Result Value Ref Range    Sodium 138 135 - 145 mmol/L    Potassium 4.4 3.4 - 5.3 mmol/L    Carbon Dioxide (CO2) 26 22 - 29 mmol/L    Anion Gap 9 7 - 15 mmol/L    Urea Nitrogen 23.2 (H) 8.0 - 23.0 mg/dL    Creatinine 1.01 0.67 - 1.17 mg/dL    GFR Estimate 79 >60 mL/min/1.73m2      Comment:      eGFR calculated using  2021 CKD-EPI equation.    Calcium 9.5 8.8 - 10.4 mg/dL    Chloride 103 98 - 107 mmol/L    Glucose 105 (H) 70 - 99 mg/dL    Alkaline Phosphatase 63 40 - 150 U/L    AST 32 0 - 45 U/L    ALT 20 0 - 70 U/L    Protein Total 6.9 6.4 - 8.3 g/dL    Albumin 4.2 3.5 - 5.2 g/dL    Bilirubin Total 0.7 <=1.2 mg/dL    Patient Fasting > 8hrs? Unknown    TSH with free T4 reflex   Result Value Ref Range    TSH 2.48 0.30 - 4.20 uIU/mL   PSA, screen   Result Value Ref Range    Prostate Specific Antigen Screen 1.16 0.00 - 6.50 ng/mL    Narrative    This result is obtained using the Roche Elecsys total PSA method on the leigh ann e801 immunoassay analyzer, which is an ultrasensitive method. Results obtained with different assay methods or kits cannot be used interchangeably.  This test is intended for initial prostate cancer screening. PSA values exceeding the age-specific limits are suspicious for prostate disease, but additional testing, such as prostate biopsy, is needed to diagnose prostate pathology. The American Cancer Society recommends annual examination with digital rectal examination and serum PSA beginning at age 50 and for men with a life expectancy of at least 10 years after detection of prostate cancer. For men in high-risk groups, such as  Americans or men with a first-degree relative diagnosed at a younger age, testing should begin at a younger age. It is generally recommended that information be provided to patients about the benefits and limitations of testing and treatment so they can make informed decisions.   Hemoglobin A1c   Result Value Ref Range    Estimated Average Glucose 128 (H) <117 mg/dL    Hemoglobin A1C 6.1 (H) 0.0 - 5.6 %      Comment:      Normal <5.7%   Prediabetes 5.7-6.4%    Diabetes 6.5% or higher     Note: Adopted from ADA consensus guidelines.   Hepatitis A Antibody Total   Result Value Ref Range    Hepatitis A Antibody Total Reactive       Comment:      This assay detects the presence of  anti-hepatitis A virus (anti-HAV) total (both IgG and IgM combined). If clinically indicated, specific testing for anti-HAV IgM (HAVM / Hepatitis A IgM Antibody, Serum) is necessary to confirm the presence of acute or recent hepatitis A. Please see interpretation guide below.    Narrative    HAV antibody testing interpretation chart:      HAV Total Antibody - NONREACTIVE  HAV IgM - NOT TESTED  Comments: No evidence of vaccination or previous infection; Susceptible to Hepatitis A infection     HAV Total Antibody - REACTIVE   HAV IgM - NOT TESTED  Comments:Consistent with recent or remote Hepatitis A infection or antibody response to HAV vaccination    HAV Total Antibody - REACTIVE  HAV IgM - NONREACTIVE  Comments:Consistent with resolved Hepatitis A infection or antibody response to HAV vaccination    HAV Total Antibody - REACTIVE  HAV IgM - REACTIVE  Comments:Consistent with active Hepatitis A infection   Hepatitis B Surface Antibody   Result Value Ref Range    Hepatitis B Surface Antibody Reactive       Comment:      A reactive result indicates recovery from acute or chronic hepatitis B virus (HBV) infection or acquired immunity from HBV vaccination. This assay does not differentiate between a vaccine-induced immune response and an immune response induced by infection with HBV. A positive total antihepatitis B core result would indicate that the hepatitis B surface antibody response is due to past HBV infection.    Hepatitis B Surface Antibody Instrument Value >1,000.00 <8.5 m[IU]/mL   Rubeola Antibody IgG   Result Value Ref Range    Rubeola (Measles) Michelle IgG Instrument Value >300.0 <13.5 AU/mL    Rubeola (Measles) Antibody IgG Positive       Comment:      Suggests previous exposure or immunization and probable immunity.   Rubella Antibody IgG   Result Value Ref Range    Rubella Michelle IgG Instrument Value >33.00 <0.90 Index    Rubella Antibody IgG Positive       Comment:      Suggests previous exposure or  immunization and probable immunity.   CBC with platelets and differential   Result Value Ref Range    WBC Count 7.7 4.0 - 11.0 10e3/uL    RBC Count 4.19 (L) 4.40 - 5.90 10e6/uL    Hemoglobin 13.6 13.3 - 17.7 g/dL    Hematocrit 39.7 (L) 40.0 - 53.0 %    MCV 95 78 - 100 fL    MCH 32.5 26.5 - 33.0 pg    MCHC 34.3 31.5 - 36.5 g/dL    RDW 12.3 10.0 - 15.0 %    Platelet Count 178 150 - 450 10e3/uL    % Neutrophils 58 %    % Lymphocytes 27 %    % Monocytes 7 %    % Eosinophils 7 %    % Basophils 1 %    % Immature Granulocytes 0 %    Absolute Neutrophils 4.5 1.6 - 8.3 10e3/uL    Absolute Lymphocytes 2.1 0.8 - 5.3 10e3/uL    Absolute Monocytes 0.6 0.0 - 1.3 10e3/uL    Absolute Eosinophils 0.5 0.0 - 0.7 10e3/uL    Absolute Basophils 0.0 0.0 - 0.2 10e3/uL    Absolute Immature Granulocytes 0.0 <=0.4 10e3/uL       If you have any questions or concerns, please call the clinic at the number listed above.       Sincerely,      Emma Rose MD    Electronically signed

## 2025-02-04 LAB
MEV IGG SER IA-ACNC: >300 AU/ML
MEV IGG SER IA-ACNC: POSITIVE
RUBV IGG SERPL QL IA: >33 INDEX
RUBV IGG SERPL QL IA: POSITIVE

## 2025-03-10 ENCOUNTER — OFFICE VISIT (OUTPATIENT)
Dept: FAMILY MEDICINE | Facility: CLINIC | Age: 75
End: 2025-03-10
Payer: COMMERCIAL

## 2025-03-10 VITALS
HEART RATE: 55 BPM | OXYGEN SATURATION: 97 % | BODY MASS INDEX: 24.92 KG/M2 | WEIGHT: 184 LBS | RESPIRATION RATE: 16 BRPM | HEIGHT: 72 IN | SYSTOLIC BLOOD PRESSURE: 107 MMHG | TEMPERATURE: 97.8 F | DIASTOLIC BLOOD PRESSURE: 62 MMHG

## 2025-03-10 DIAGNOSIS — Z71.84 TRAVEL ADVICE ENCOUNTER: Primary | ICD-10-CM

## 2025-03-10 RX ORDER — TAMSULOSIN HYDROCHLORIDE 0.4 MG/1
0.4 CAPSULE ORAL DAILY
COMMUNITY

## 2025-03-10 RX ORDER — AZITHROMYCIN 500 MG/1
500 TABLET, FILM COATED ORAL DAILY
Qty: 3 TABLET | Refills: 0 | Status: SHIPPED | OUTPATIENT
Start: 2025-03-10 | End: 2025-03-13

## 2025-03-10 ASSESSMENT — PAIN SCALES - GENERAL: PAINLEVEL_OUTOF10: NO PAIN (0)

## 2025-03-10 NOTE — PATIENT INSTRUCTIONS
Thank you for visiting the [facility] : [phone]  Today March 10, 2025 you received the    Typhoid - injectable. This vaccine is valid for two years.     Follow up vaccine appointments can be made as a NURSE ONLY visit at the Travel Clinic, (BE PREPARED TO WAIT, ) or at designated [facility].    If you are receiving the Rabies vaccines series, it is important that you follow the exact schedule ordered.     Pre-travel     We recommend that you purchase Medical Evacuation Insurance prior to your departure.  Https://wwwnc.cdc.gov/travel/page/insurance    [place] your travel plans with the  Department of State through STEP ( Smart Traveler Enrollment Program ) https://step.state.gov.  STEP is a free service to allow [place] citizens and nationals traveling and living abroad to enroll their trip with the nearest U.[place]. Embassy or Consulate.    Animal Exposure: Avoid all mammals even if they look healthy.  If there is a bite, scratch or even a lick, wash area immediately with soap and water for 15 minutes and seek medical care within 24 hours for evaluation of Rabies post exposure treatment.  Contact your Medical Evacuation Insurance.    Repiratory illness prevention strategies ( Covid and Influenza ) CDC recommendations:  Consider wearing a mask in crowded or poorly ventilated indoor areas, including on public transportation and in transportation hubs.  Hand washing: frequent, thorough handwashing with soap and water for 20 seconds. Use an alcohol-based hand  with at least 60% alcohol if soap and water are not readily available. Avoid touching face, nose, eyes, mouth unless you have done appropriate hand washing as above.  VACCINES: Staying up to date on COVID-19 vaccines significantly lowers the risk of getting very sick, being hospitalized, or dying from COVID-19. CDC recommends that everyone stay up to date on their COVID-19 vaccines, especially people with  weakened immune systems.    Travel Covid 19 Testing:  updated 12/06/2021  International travelers: Pre-travel:  See country specific Embassy websites or airline websites.    Post travel: CDC recommends getting tested 3-5 days after your trip     Post-travel illness:  Contact your provider or [facility] if you develop a fever, rash, cough, diarrhea or other symptoms for up to 1 year after travel.  Inform your healthcare provider when and where you traveled to.    Please call the [facility] with any questions [phone]  Or send your provider a 'My Chart' note.

## 2025-03-10 NOTE — PROGRESS NOTES
Nurse Note ( Pre-Travel Consult)    Itinerary:  [facility]    Departure Date: 3/23/25    Return Date: 4/5/25    Length of Trip 2 weeks    Reason for Travel: Tourism         Urban or rural: both    Accommodations: Hotel        IMMUNIZATION HISTORY  Have you received any immunizations within the past 4 weeks?  No  Have you ever fainted from having your blood drawn or from an injection?  No  Have you ever had a fever reaction to vaccination?  No  Have you ever had any bad reaction or side effect from any vaccination?  No  Do you live (or work closely) with anyone who has AIDS, an AIDS-like condition, any other immune disorder or who is on chemotherapy for cancer?  No  Do you have a family history of immunodeficiency?  No  Have you received any injection of immune globulin or any blood products during the past 12 months?  No    Patient roomed by Mikey Hines  Javad Nicholson II is a 75 year old male seen today with spouse for counsultation for international travel.   Patient will be departing in  2 week(s) and  traveling with spouse  with organized tour group.      Patient itinerary :  will be in the urban region of [facility]> [facility] [place] [facility] [place] [facility] which risk for Dengue Fever, food borne illnesses, motor vehicle accidents, and Typhoid. exposure.      Patient's activities will include sightseeing and travel by car or other vehicle.    Patient's country of birth is [place]    Special medical concerns: aortic root Enlargement  Pre-travel questionnaire was completed by patient and reviewed by provider.     Vitals: /62   Pulse 55   Temp 97.8  F (36.6  C) (Temporal)   Resp 16   Ht 1.829 m (6')   Wt 83.5 kg (184 lb)   SpO2 97%   BMI 24.95 kg/m    BMI= Body mass index is 24.95 kg/m .    EXAM:  General:  Well-nourished, well-developed in no acute distress.  Appears to be stated age, interacts appropriately and expresses understanding of information given to patient.    Current Outpatient  Medications   Medication Sig Dispense Refill    azithromycin (ZITHROMAX) 500 MG tablet Take 1 tablet (500 mg) by mouth daily for 3 doses. Take 1 tablet a day for up to 3 days for severe diarrhea 3 tablet 0    ascorbic acid (ASCORBIC ACID) 250 mg Chew [ASCORBIC ACID (ASCORBIC ACID) 250 MG CHEW] Chew 250 mg.      atenolol (TENORMIN) 25 MG tablet [ATENOLOL (TENORMIN) 25 MG TABLET]       atovaquone-proguanil (MALARONE) 250-100 MG tablet Take 1 tablet by mouth daily. Start 2 days before travel and continue 7 days after return. 23 tablet 0    SIMBRINZA 1-0.2 % ophthalmic suspension PLACE 1 DROP INTO BOTH EYES TWO TIMES A DAY.      tamsulosin (FLOMAX) 0.4 MG capsule Take 0.4 mg by mouth daily.       Patient Active Problem List   Diagnosis    Impaired Fasting Glucose    Irritable Bowel Syndrome    Aneurysm Of The Aortic Arch    Sleep Apnea    [place] teeth extracted    Hx of tonsillectomy    History of colonic polyps    Osteoarthritis of right knee, unspecified osteoarthritis type    Aortic root enlargement     Allergies   Allergen Reactions    Fd&C Yellow #5 (Tartrazine) Itching         Immunizations discussed include:   Covid 19: Up to date  Hepatitis A:  Up to date  Hepatitis B: Up to date (dentist)   Influenza: Up to date  Typhoid: Ordered/given today, risks, benefits and side effects reviewed  Rabies: Declined  reviewed managment of a animal bite or scratch (washing wound, seek medical care within 24 hours for post exposure prophylaxis )  Yellow Fever: Not indicated  Japanese Encephalitis: Not indicated - risk of disease is minimal off season and urban only   Meningococcus: Not indicated  Tetanus/Diphtheria: Up to date  Measles/Mumps/Rubella: Immune by disease history per patient report  Cholera: Not needed  Polio: Not indicated  Pneumococcal: due for Pneumo  21 but will get at pharmacy  Varicella: Immune by disease history per patient report  Shingrix: Up to date  HPV:  Not indicated   [place]: benefits do not  outweigh the risk    Mpox:  Not indicated     TB: low risk     Altitude Exposure on this trip: no  Past tolerance to Altitude: na    ASSESSMENT/PLAN:  Bill was seen today for travel clinic.    Diagnoses and all orders for this visit:    Travel advice encounter  -     azithromycin (ZITHROMAX) 500 MG tablet; Take 1 tablet (500 mg) by mouth daily for 3 doses. Take 1 tablet a day for up to 3 days for severe diarrhea    Other orders  -     TYPHOID VACCINE, IM      I have reviewed general recommendations for safe travel   including: food/water precautions, insect precautions, roadway safety. Educational materials and [employer] report provided.    Malaraia prophylaxis recommended: not needed  Symptomatic treatment for traveler's diarrhea: azithromycin        Evacuation insurance advised and resources were provided to patient.    Total visit time 20 minutes  with over 50% of time spent counseling patient and shared decision making as detailed above.    Mandi Wiseman CNP  Certificate in Travel Health